# Patient Record
Sex: FEMALE | Race: BLACK OR AFRICAN AMERICAN | Employment: FULL TIME | URBAN - METROPOLITAN AREA
[De-identification: names, ages, dates, MRNs, and addresses within clinical notes are randomized per-mention and may not be internally consistent; named-entity substitution may affect disease eponyms.]

---

## 2017-07-10 ENCOUNTER — OFFICE VISIT (OUTPATIENT)
Dept: INTERNAL MEDICINE CLINIC | Age: 37
End: 2017-07-10

## 2017-07-10 VITALS
WEIGHT: 244.7 LBS | RESPIRATION RATE: 16 BRPM | TEMPERATURE: 98 F | DIASTOLIC BLOOD PRESSURE: 90 MMHG | OXYGEN SATURATION: 97 % | HEIGHT: 62 IN | BODY MASS INDEX: 45.03 KG/M2 | HEART RATE: 69 BPM | SYSTOLIC BLOOD PRESSURE: 120 MMHG

## 2017-07-10 DIAGNOSIS — E78.00 HYPERCHOLESTEREMIA: ICD-10-CM

## 2017-07-10 DIAGNOSIS — G25.81 RESTLESS LEG SYNDROME: Primary | ICD-10-CM

## 2017-07-10 DIAGNOSIS — R53.82 CHRONIC FATIGUE: ICD-10-CM

## 2017-07-10 DIAGNOSIS — M62.838 MUSCLE SPASM: ICD-10-CM

## 2017-07-10 DIAGNOSIS — Z76.89 ESTABLISHING CARE WITH NEW DOCTOR, ENCOUNTER FOR: ICD-10-CM

## 2017-07-10 LAB
CHOLEST SERPL-MCNC: 213 MG/DL
GLUCOSE POC: 98 MG/DL
HDLC SERPL-MCNC: 56 MG/DL
LDL CHOLESTEROL POC: 128 MG/DL
NON-HDL GOAL (POC): 157
TCHOL/HDL RATIO (POC): 3.8
TRIGL SERPL-MCNC: 144 MG/DL

## 2017-07-10 RX ORDER — GABAPENTIN 100 MG/1
CAPSULE ORAL
Qty: 30 CAP | Refills: 6 | Status: SHIPPED | OUTPATIENT
Start: 2017-07-10 | End: 2018-01-16 | Stop reason: SDUPTHER

## 2017-07-10 RX ORDER — CYCLOBENZAPRINE HCL 10 MG
10 TABLET ORAL 2 TIMES DAILY
Qty: 60 TAB | Refills: 12 | Status: SHIPPED | OUTPATIENT
Start: 2017-07-10 | End: 2018-09-01 | Stop reason: SDUPTHER

## 2017-07-10 RX ORDER — PREDNISOLONE ACETATE 10 MG/ML
SUSPENSION/ DROPS OPHTHALMIC
COMMUNITY
Start: 2017-06-27 | End: 2018-02-19 | Stop reason: ALTCHOICE

## 2017-07-10 RX ORDER — IBUPROFEN 800 MG/1
800 TABLET ORAL
Qty: 60 TAB | Refills: 12 | Status: SHIPPED | OUTPATIENT
Start: 2017-07-10 | End: 2018-07-13 | Stop reason: SDUPTHER

## 2017-07-10 RX ORDER — CLINDAMYCIN PHOSPHATE 10 UG/ML
LOTION TOPICAL
COMMUNITY
Start: 2017-06-29 | End: 2020-11-03 | Stop reason: ALTCHOICE

## 2017-07-10 NOTE — PROGRESS NOTES
Alexy Wen is a 40 y.o. female and presents with \[facility] [place] [facility]\"" Care  . Subjective:  She had had a corneal transplant in the past  She states she is disabled from her eye problem. .      She states she needs thyroid testing. She has had recurrent muscle spasms  She has had aching in her calf muscles mainly at night with a crawling sensation reported      Review of Systems  Constitutional: negative for fevers, chills, anorexia and weight loss  Eyes:   visual disturbance and irritation  ENT:   negative for tinnitus,sore throat,nasal congestion,ear pains. hoarseness  Respiratory:  negative for cough, hemoptysis, dyspnea,wheezing  CV:   negative for chest pain, palpitations, lower extremity edema  GI:   negative for nausea, vomiting, diarrhea, abdominal pain,melena  Endo:               negative for polyuria,polydipsia,polyphagia,heat intolerance  Genitourinary: negative for frequency, dysuria and hematuria  Integument:  negative for rash and pruritus  Hematologic:  negative for easy bruising and gum/nose bleeding  Musculoskel: myalgias, arthralgias,  Neurological:  negative for headaches, dizziness, vertigo, memory problems and gait   Behavl/Psych: negative for feelings of anxiety, depression, mood changes    History reviewed. No pertinent past medical history.   Past Surgical History:   Procedure Laterality Date    HX HEENT  08/2016    cornea transplant    HX HEENT  2015    cornea transplant     Social History     Social History    Marital status:      Spouse name: N/A    Number of children: N/A    Years of education: N/A     Social History Main Topics    Smoking status: Former Smoker    Smokeless tobacco: Never Used    Alcohol use No    Drug use: No    Sexual activity: Yes     Partners: Male     Other Topics Concern    None     Social History Narrative    None     Family History   Problem Relation Age of Onset    Hypertension Mother     Hypertension Father     Diabetes Father      Current Outpatient Prescriptions   Medication Sig Dispense Refill    clindamycin (CLEOCIN T) 1 % lotion       prednisoLONE acetate (PRED FORTE) 1 % ophthalmic suspension       gabapentin (NEURONTIN) 100 mg capsule Si tab daily 2 hours before bedtime 30 Cap 6    ibuprofen (MOTRIN) 800 mg tablet Take 1 Tab by mouth two (2) times daily (after meals). 60 Tab 12    cyclobenzaprine (FLEXERIL) 10 mg tablet Take 1 Tab by mouth two (2) times a day. 60 Tab 12     Not on File    Objective:  Visit Vitals    /90    Pulse 69    Temp 98 °F (36.7 °C) (Oral)    Resp 16    Ht 5' 2\" (1.575 m)    Wt 244 lb 11.2 oz (111 kg)    SpO2 97%    BMI 44.76 kg/m2     Physical Exam:   General appearance - alert, well appearing, and in no distress  Mental status - alert, oriented to person, place, and time  EYE-EVELIA, EOMI, corneas normal, no foreign bodies  ENT-ENT exam normal, no neck nodes or sinus tenderness  Nose - normal and patent, no erythema, discharge or polyps  Mouth - mucous membranes moist, pharynx normal without lesions  Neck - supple, no significant adenopathy   Chest - clear to auscultation, no wheezes, rales or rhonchi, symmetric air entry   Heart - normal rate, regular rhythm, normal S1, S2, no murmurs, rubs, clicks or gallops   Abdomen - soft, nontender, nondistended, no masses or organomegaly  Lymph- no adenopathy palpable  Ext-peripheral pulses normal, no pedal edema, no clubbing or cyanosis  Skin-Warm and dry.  no hyperpigmentation, vitiligo, or suspicious lesions  Neuro -alert, oriented, normal speech, no focal findings or movement disorder noted  Neck-normal C-spine, no tenderness, full ROM without pain  Feet-no nail deformities or callus formation with good pulses noted      Results for orders placed or performed in visit on 07/10/17   AMB POC LIPID PROFILE   Result Value Ref Range    Cholesterol (POC) 213     Triglycerides (POC) 144     HDL Cholesterol (POC) 56     LDL Cholesterol (POC) 128 MG/DL    Non-HDL Goal (POC) 157     TChol/HDL Ratio (POC) 3.8    AMB POC GLUCOSE BLOOD, BY GLUCOSE MONITORING DEVICE   Result Value Ref Range    Glucose POC 98 mg/dL       Assessment/Plan:    ICD-10-CM ICD-9-CM    1. Restless leg syndrome G25.81 333.94    2. Establishing care with new doctor, encounter for Z71.89 V65.8 clindamycin (CLEOCIN T) 1 % lotion      prednisoLONE acetate (PRED FORTE) 1 % ophthalmic suspension      AMB POC LIPID PROFILE      CBC W/O DIFF      METABOLIC PANEL, COMPREHENSIVE   3. Chronic fatigue R53.82 780.79 AMB POC GLUCOSE BLOOD, BY GLUCOSE MONITORING DEVICE      T3 TOTAL      T4, FREE      TSH 3RD GENERATION   4. Muscle spasm M62.838 728.85    5. Hypercholesteremia E78.00 272.0      Orders Placed This Encounter    CBC W/O DIFF    METABOLIC PANEL, COMPREHENSIVE    T3 TOTAL    T4, FREE    TSH 3RD GENERATION    AMB POC LIPID PROFILE    AMB POC GLUCOSE BLOOD, BY GLUCOSE MONITORING DEVICE    clindamycin (CLEOCIN T) 1 % lotion    prednisoLONE acetate (PRED FORTE) 1 % ophthalmic suspension    gabapentin (NEURONTIN) 100 mg capsule     Sig: Si tab daily 2 hours before bedtime     Dispense:  30 Cap     Refill:  6    ibuprofen (MOTRIN) 800 mg tablet     Sig: Take 1 Tab by mouth two (2) times daily (after meals). Dispense:  60 Tab     Refill:  12    cyclobenzaprine (FLEXERIL) 10 mg tablet     Sig: Take 1 Tab by mouth two (2) times a day. Dispense:  60 Tab     Refill:  12     lose weight, increase physical activity, follow low fat diet, follow low salt diet,Take 81mg aspirin daily  Patient Instructions   [employer] Activation    Thank you for requesting access to [employer]. Please follow the instructions below to securely access and download your online medical record. [employer] allows you to send messages to your doctor, view your test results, renew your prescriptions, schedule appointments, and more. How Do I Sign Up? 1. In your internet browser, go to [email or web address] [employer] Management  2.  Click on the First Time User? Click Here link in the Sign In box. You will be redirect to the New Member Sign Up page. 3. Enter your Yotpot Access Code exactly as it appears below. You will not need to use this code after youve completed the sign-up process. If you do not sign up before the expiration date, you must request a new code. MyChart Access Code: Activation code not generated  Current [employer] Status: Patient Declined (This is the date your MyChart access code will )    4. Enter the last four digits of your Social Security Number (xxxx) and Date of Birth (mm/dd/yyyy) as indicated and click Submit. You will be taken to the next sign-up page. 5. Create a Yotpot ID. This will be your [employer] login ID and cannot be changed, so think of one that is secure and easy to remember. 6. Create a Yotpot password. You can change your password at any time. 7. Enter your Password Reset Question and Answer. This can be used at a later time if you forget your password. 8. Enter your e-mail address. You will receive e-mail notification when new information is available in [address] 9. Click Sign Up. You can now view and download portions of your medical record. 10. Click the Download Summary menu link to download a portable copy of your medical information. Additional Information    If you have questions, please visit the Frequently Asked Questions section of the Yotpot website at [email or web address] Remember, [employer] is NOT to be used for urgent needs. For medical emergencies, dial 911. Follow-up Disposition:  Return in about 4 weeks (around 2017), or if symptoms worsen or fail to improve. I have reviewed with the patient details of the assessment and plan and all questions were answered. Relevent patient education was performed. The most recent lab findings were reviewed with the patient. An After Visit Summary was printed and given to the patient.

## 2017-07-10 NOTE — MR AVS SNAPSHOT
Visit Information Date & Time Provider Department Dept. Phone Encounter #  
 7/10/2017  8:45 AM Shi Pimentel MD [facility] [phone] [id number] Follow-up Instructions Return in about 4 weeks (around 8/7/2017), or if symptoms worsen or fail to improve. Upcoming Health Maintenance Date Due  
 PAP AKA CERVICAL CYTOLOGY 4/17/2001 DTaP/Tdap/Td series (1 - Tdap) 1/24/2019* INFLUENZA AGE 9 TO ADULT 8/1/2017 *Topic was postponed. The date shown is not the original due date. Allergies as of 7/10/2017  Review Complete On: 7/10/2017 By: Shi Pimentel MD  
 Not on File Current Immunizations  Never Reviewed No immunizations on file. Not reviewed this visit You Were Diagnosed With   
  
 Codes Comments Restless leg syndrome    -  Primary ICD-10-CM: G25.81 ICD-9-CM: 333.94 Establishing care with new doctor, encounter for     ICD-10-CM: Z71.89 ICD-9-CM: V65.8 Chronic fatigue     ICD-10-CM: R53.82 
ICD-9-CM: 780.79 Muscle spasm     ICD-10-CM: D75.596 ICD-9-CM: 728.85 Hypercholesteremia     ICD-10-CM: E78.00 ICD-9-CM: 272.0 Vitals BP Pulse Temp Resp Height(growth percentile) Weight(growth percentile) 120/90 69 98 °F (36.7 °C) (Oral) 16 5' 2\" (1.575 m) 244 lb 11.2 oz (111 kg) SpO2 BMI Smoking Status 97% 44.76 kg/m2 Former Smoker BMI and BSA Data Body Mass Index Body Surface Area 44.76 kg/m 2 2.2 m 2 Preferred Pharmacy Pharmacy Name Phone [address], [address] [phone] Your Updated Medication List  
  
   
This list is accurate as of: 7/10/17 10:24 AM.  Always use your most recent med list.  
  
  
  
  
 clindamycin 1 % lotion Commonly known as:  CLEOCIN T  
  
 cyclobenzaprine 10 mg tablet Commonly known as:  FLEXERIL  
 Take 1 Tab by mouth two (2) times a day.  
  
 gabapentin 100 mg capsule Commonly known as:  NEURONTIN Si tab daily 2 hours before bedtime  
  
 ibuprofen 800 mg tablet Commonly known as:  MOTRIN Take 1 Tab by mouth two (2) times daily (after meals). prednisoLONE acetate 1 % ophthalmic suspension Commonly known as:  PRED FORTE Prescriptions Sent to Pharmacy Refills  
 gabapentin (NEURONTIN) 100 mg capsule 6 Sig: Si tab daily 2 hours before bedtime Class: Normal  
 Pharmacy: [facility] [address], [address] [facility] Ph #: [phone]  
 ibuprofen (MOTRIN) 800 mg tablet 12 Sig: Take 1 Tab by mouth two (2) times daily (after meals). Class: Normal  
 Pharmacy: [address] Ph #: [phone] Route: Oral  
 cyclobenzaprine (FLEXERIL) 10 mg tablet 12 Sig: Take 1 Tab by mouth two (2) times a day. Class: Normal  
 Pharmacy: [facility] [address] Ph #: [phone] Route: Oral  
  
We Performed the Following AMB POC GLUCOSE BLOOD, BY GLUCOSE MONITORING DEVICE [30647 CPT(R)] AMB POC LIPID PROFILE [90474 CPT(R)] CBC W/O DIFF [83069 CPT(R)] METABOLIC PANEL, COMPREHENSIVE [37142 CPT(R)]   
 T3 TOTAL [56445 CPT(R)] T4, FREE [id number] CPT(R)] TSH 3RD GENERATION [62426 CPT(R)] Follow-up Instructions Return in about 4 weeks (around 2017), or if symptoms worsen or fail to improve. Patient Instructions IROCKE Activation Thank you for requesting access to IROCKE. Please follow the instructions below to securely access and download your online medical record.  IROCKE allows you to send messages to your doctor, view your test results, renew your prescriptions, schedule appointments, and more. How Do I Sign Up? 1. In your internet browser, go to [email or web address]. [employer] 
2. Click on the First Time User? Click Here link in the Sign In box. You will be redirect to the New Member Sign Up page. 3. Enter your Tranz Access Code exactly as it appears below. You will not need to use this code after youve completed the sign-up process. If you do not sign up before the expiration date, you must request a new code. MyChart Access Code: Activation code not generated Current Tranz Status: Patient Declined (This is the date your MyChart access code will ) 4. Enter the last four digits of your Social Security Number (xxxx) and Date of Birth (mm/dd/yyyy) as indicated and click Submit. You will be taken to the next sign-up page. 5. Create a Tranz ID. This will be your Tranz login ID and cannot be changed, so think of one that is secure and easy to remember. 6. Create a Tranz password. You can change your password at any time. 7. Enter your Password Reset Question and Answer. This can be used at a later time if you forget your password. 8. Enter your e-mail address. You will receive e-mail notification when new information is available in [address] 9. Click Sign Up. You can now view and download portions of your medical record. 10. Click the Download Summary menu link to download a portable copy of your medical information. Additional Information If you have questions, please visit the Frequently Asked Questions section of the Tranz website at [email or web address] Remember, Tranz is NOT to be used for urgent needs. For medical emergencies, dial 911. Please provide this summary of care documentation to your next provider. Your primary care clinician is listed as Kenji Alfredo. If you have any questions after today's visit, please call [phone].

## 2017-07-10 NOTE — PATIENT INSTRUCTIONS
[employer] Activation    Thank you for requesting access to wishkicker. Please follow the instructions below to securely access and download your online medical record. wishkicker allows you to send messages to your doctor, view your test results, renew your prescriptions, schedule appointments, and more. How Do I Sign Up? 1. In your internet browser, go to [email or web address]  2. Click on the First Time User? Click Here link in the Sign In box. You will be redirect to the New Member Sign Up page. 3. Enter your wishkicker Access Code exactly as it appears below. You will not need to use this code after youve completed the sign-up process. If you do not sign up before the expiration date, you must request a new code. wishkicker Access Code: Activation code not generated  Current wishkicker Status: Patient Declined (This is the date your wishkicker access code will )    4. Enter the last four digits of your Social Security Number (xxxx) and Date of Birth (mm/dd/yyyy) as indicated and click Submit. You will be taken to the next sign-up page. 5. Create a wishkicker ID. This will be your wishkicker login ID and cannot be changed, so think of one that is secure and easy to remember. 6. Create a wishkicker password. You can change your password at any time. 7. Enter your Password Reset Question and Answer. This can be used at a later time if you forget your password. 8. Enter your e-mail address. You will receive e-mail notification when new information is available in [address] 9. Click Sign Up. You can now view and download portions of your medical record. 10. Click the Download Summary menu link to download a portable copy of your medical information. Additional Information    If you have questions, please visit the Frequently Asked Questions section of the wishkicker website at [email or web address] Remember, wishkicker is NOT to be used for urgent needs. For medical emergencies, dial 911.

## 2017-07-11 LAB
ALBUMIN SERPL-MCNC: 4.1 G/DL (ref 3.5–5.5)
ALBUMIN/GLOB SERPL: 1.4 {RATIO} (ref 1.2–2.2)
ALP SERPL-CCNC: 59 IU/L (ref 39–117)
ALT SERPL-CCNC: 7 IU/L (ref 0–32)
AST SERPL-CCNC: 12 IU/L (ref 0–40)
BILIRUB SERPL-MCNC: 0.3 MG/DL (ref 0–1.2)
BUN SERPL-MCNC: 10 MG/DL (ref 6–20)
BUN/CREAT SERPL: 10 (ref 9–23)
CALCIUM SERPL-MCNC: 9.3 MG/DL (ref 8.7–10.2)
CHLORIDE SERPL-SCNC: 101 MMOL/L (ref 96–106)
CO2 SERPL-SCNC: 27 MMOL/L (ref 18–29)
CREAT SERPL-MCNC: 1.02 MG/DL (ref 0.57–1)
ERYTHROCYTE [DISTWIDTH] IN BLOOD BY AUTOMATED COUNT: 12.9 % (ref 12.3–15.4)
GLOBULIN SER CALC-MCNC: 2.9 G/DL (ref 1.5–4.5)
GLUCOSE SERPL-MCNC: 92 MG/DL (ref 65–99)
HCT VFR BLD AUTO: 41.6 % (ref 34–46.6)
HGB BLD-MCNC: 13.7 G/DL (ref 11.1–15.9)
MCH RBC QN AUTO: 29.9 PG (ref 26.6–33)
MCHC RBC AUTO-ENTMCNC: 32.9 G/DL (ref 31.5–35.7)
MCV RBC AUTO: 91 FL (ref 79–97)
PLATELET # BLD AUTO: 339 X10E3/UL (ref 150–379)
POTASSIUM SERPL-SCNC: 4.7 MMOL/L (ref 3.5–5.2)
PROT SERPL-MCNC: 7 G/DL (ref 6–8.5)
RBC # BLD AUTO: 4.58 X10E6/UL (ref 3.77–5.28)
SODIUM SERPL-SCNC: 141 MMOL/L (ref 134–144)
T3 SERPL-MCNC: 130 NG/DL (ref 71–180)
T4 FREE SERPL-MCNC: 1.21 NG/DL (ref 0.82–1.77)
TSH SERPL DL<=0.005 MIU/L-ACNC: 1.25 UIU/ML (ref 0.45–4.5)
WBC # BLD AUTO: 4.5 X10E3/UL (ref 3.4–10.8)

## 2017-08-10 ENCOUNTER — OFFICE VISIT (OUTPATIENT)
Dept: INTERNAL MEDICINE CLINIC | Age: 37
End: 2017-08-10

## 2017-08-10 VITALS
SYSTOLIC BLOOD PRESSURE: 124 MMHG | HEIGHT: 62 IN | RESPIRATION RATE: 16 BRPM | WEIGHT: 245 LBS | HEART RATE: 74 BPM | BODY MASS INDEX: 45.08 KG/M2 | OXYGEN SATURATION: 98 % | TEMPERATURE: 98.9 F | DIASTOLIC BLOOD PRESSURE: 86 MMHG

## 2017-08-10 DIAGNOSIS — E78.00 HYPERCHOLESTEREMIA: Primary | ICD-10-CM

## 2017-08-10 DIAGNOSIS — R53.82 CHRONIC FATIGUE: ICD-10-CM

## 2017-08-10 NOTE — PROGRESS NOTES
Reuben Escalera is a 40 y.o. female and presents with Results (labs)  . Subjective:  Dyslipidemia Review:  Patient presents for evaluation of lipids. Compliance with treatment thus far has been excellent. A repeat fasting lipid profile was done. The patient does not use medications that may worsen dyslipidemias (corticosteroids, progestins, anabolic steroids, diuretics, beta-blockers, amiodarone, cyclosporine, olanzapine). The patient exercises some  She is not on medication    She still has some fatigue reported. All labs except lipid panel were normal    Review of Systems  Constitutional: negative for fevers, chills, anorexia and weight loss  Eyes:   negative for visual disturbance and irritation  ENT:   negative for tinnitus,sore throat,nasal congestion,ear pains. hoarseness  Respiratory:  negative for cough, hemoptysis, dyspnea,wheezing  CV:   negative for chest pain, palpitations, lower extremity edema  GI:   negative for nausea, vomiting, diarrhea, abdominal pain,melena  Endo:               negative for polyuria,polydipsia,polyphagia,heat intolerance  Genitourinary: negative for frequency, dysuria and hematuria  Integument:  negative for rash and pruritus  Hematologic:  negative for easy bruising and gum/nose bleeding  Musculoskel: negative for myalgias, arthralgias, back pain, muscle weakness, joint pain  Neurological:  negative for headaches, dizziness, vertigo, memory problems and gait   Behavl/Psych: negative for feelings of anxiety, depression, mood changes    History reviewed. No pertinent past medical history.   Past Surgical History:   Procedure Laterality Date    HX HEENT  08/2016    cornea transplant    HX HEENT  2015    cornea transplant     Social History     Social History    Marital status:      Spouse name: N/A    Number of children: N/A    Years of education: N/A     Social History Main Topics    Smoking status: Former Smoker    Smokeless tobacco: Never Used    Alcohol use No    Drug use: No    Sexual activity: Yes     Partners: Male     Other Topics Concern    None     Social History Narrative     Family History   Problem Relation Age of Onset    Hypertension Mother     Hypertension Father     Diabetes Father      Current Outpatient Prescriptions   Medication Sig Dispense Refill    clindamycin (CLEOCIN T) 1 % lotion       prednisoLONE acetate (PRED FORTE) 1 % ophthalmic suspension       gabapentin (NEURONTIN) 100 mg capsule Si tab daily 2 hours before bedtime 30 Cap 6    ibuprofen (MOTRIN) 800 mg tablet Take 1 Tab by mouth two (2) times daily (after meals). 60 Tab 12    cyclobenzaprine (FLEXERIL) 10 mg tablet Take 1 Tab by mouth two (2) times a day. 60 Tab 12     Not on File    Objective:  Visit Vitals    /86    Pulse 74    Temp 98.9 °F (37.2 °C) (Oral)    Resp 16    Ht 5' 2\" (1.575 m)    Wt 245 lb (111.1 kg)    SpO2 98%    BMI 44.81 kg/m2     Physical Exam:   General appearance - alert, well appearing, and in no distress  Mental status - alert, oriented to person, place, and time  EYE-EVELIA, EOMI, corneas normal, no foreign bodies  ENT-ENT exam normal, no neck nodes or sinus tenderness  Nose - normal and patent, no erythema, discharge or polyps  Mouth - mucous membranes moist, pharynx normal without lesions  Neck - supple, no significant adenopathy   Chest - clear to auscultation, no wheezes, rales or rhonchi, symmetric air entry   Heart - normal rate, regular rhythm, normal S1, S2, no murmurs, rubs, clicks or gallops   Abdomen - soft, nontender, nondistended, no masses or organomegaly  Lymph- no adenopathy palpable  Ext-peripheral pulses normal, no pedal edema, no clubbing or cyanosis  Skin-Warm and dry.  no hyperpigmentation, vitiligo, or suspicious lesions  Neuro -alert, oriented, normal speech, no focal findings or movement disorder noted  Neck-normal C-spine, no tenderness, full ROM without pain  Feet-no nail deformities or callus formation with good pulses noted      Results for orders placed or performed in visit on 07/10/17   CBC W/O DIFF   Result Value Ref Range    WBC 4.5 3.4 - 10.8 x10E3/uL    RBC 4.58 3.77 - 5.28 x10E6/uL    HGB 13.7 11.1 - 15.9 g/dL    HCT 41.6 34.0 - 46.6 %    MCV 91 79 - 97 fL    MCH 29.9 26.6 - 33.0 pg    MCHC 32.9 31.5 - 35.7 g/dL    RDW 12.9 12.3 - 15.4 %    PLATELET 770 422 - 264 [id number]/WJ   METABOLIC PANEL, COMPREHENSIVE   Result Value Ref Range    Glucose 92 65 - 99 mg/dL    BUN 10 6 - 20 mg/dL    Creatinine 1.02 (H) 0.57 - 1.00 mg/dL    GFR est non-AA 70 >59 mL/min/1.73    GFR est AA 81 >59 mL/min/1.73    BUN/Creatinine ratio 10 9 - 23    Sodium 141 134 - 144 mmol/L    Potassium 4.7 3.5 - 5.2 mmol/L    Chloride 101 96 - 106 mmol/L    CO2 27 18 - 29 mmol/L    Calcium 9.3 8.7 - 10.2 mg/dL    Protein, total 7.0 6.0 - 8.5 g/dL    Albumin 4.1 3.5 - 5.5 g/dL    GLOBULIN, TOTAL 2.9 1.5 - 4.5 g/dL    A-G Ratio 1.4 1.2 - 2.2    Bilirubin, total 0.3 0.0 - 1.2 mg/dL    Alk. phosphatase 59 39 - 117 IU/L    AST (SGOT) 12 0 - 40 IU/L    ALT (SGPT) 7 0 - 32 IU/L   T3 TOTAL   Result Value Ref Range    T3, total 130 71 - 180 ng/dL   T4, FREE   Result Value Ref Range    T4, Free 1.21 0.82 - 1.77 ng/dL   TSH 3RD GENERATION   Result Value Ref Range    TSH 1.250 0.450 - 4.500 uIU/mL   AMB POC LIPID PROFILE   Result Value Ref Range    Cholesterol (POC) 213     Triglycerides (POC) 144     HDL Cholesterol (POC) 56     LDL Cholesterol (POC) 128 MG/DL    Non-HDL Goal (POC) 157     TChol/HDL Ratio (POC) 3.8    AMB POC GLUCOSE BLOOD, BY GLUCOSE MONITORING DEVICE   Result Value Ref Range    Glucose POC 98 mg/dL       Assessment/Plan:    ICD-10-CM ICD-9-CM    1. Hypercholesteremia E78.00 272.0    2. Chronic fatigue R53.82 780.79      No orders of the defined types were placed in this encounter.     lose weight, increase physical activity, follow low fat diet, follow low salt diet,Take 81mg aspirin daily  The patient has elected lifestyle modification to include diet and exercise  Patient Instructions   MyChart Activation    Thank you for requesting access to Ornis. Please follow the instructions below to securely access and download your online medical record. Ornis allows you to send messages to your doctor, view your test results, renew your prescriptions, schedule appointments, and more. How Do I Sign Up? 1. In your internet browser, go to [email or web address]  2. Click on the First Time User? Click Here link in the Sign In box. You will be redirect to the New Member Sign Up page. 3. Enter your Ornis Access Code exactly as it appears below. You will not need to use this code after youve completed the sign-up process. If you do not sign up before the expiration date, you must request a new code. Ornis Access Code: Activation code not generated  Current Ornis Status: Patient Declined (This is the date your Ornis access code will )    4. Enter the last four digits of your Social Security Number (xxxx) and Date of Birth (mm/dd/yyyy) as indicated and click Submit. You will be taken to the next sign-up page. 5. Create a Ornis ID. This will be your Ornis login ID and cannot be changed, so think of one that is secure and easy to remember. 6. Create a Ornis password. You can change your password at any time. 7. Enter your Password Reset Question and Answer. This can be used at a later time if you forget your password. 8. Enter your e-mail address. You will receive e-mail notification when new information is available in [address] 9. Click Sign Up. You can now view and download portions of your medical record. 10. Click the Download Summary menu link to download a portable copy of your medical information. Additional Information    If you have questions, please visit the Frequently Asked Questions section of the Ornis website at [email or web address] Remember, Ornis is NOT to be used for urgent needs. For medical emergencies, dial 911. Follow-up Disposition:  Return in about 3 months (around 11/10/2017), or if symptoms worsen or fail to improve. I have reviewed with the patient details of the assessment and plan and all questions were answered. Relevent patient education was performed. The most recent lab findings were reviewed with the patient. An After Visit Summary was printed and given to the patient.

## 2017-08-10 NOTE — PATIENT INSTRUCTIONS
InstantLuxehar3Leaf Activation    Thank you for requesting access to [facility]. Please follow the instructions below to securely access and download your online medical record. [facility] allows you to send messages to your doctor, view your test results, renew your prescriptions, schedule appointments, and more. How Do I Sign Up? 1. In your internet browser, go to [email or web address]  2. Click on the First Time User? Click Here link in the Sign In box. You will be redirect to the New Member Sign Up page. 3. Enter your [facility] Access Code exactly as it appears below. You will not need to use this code after youve completed the sign-up process. If you do not sign up before the expiration date, you must request a new code. [facility] Access Code: Activation code not generated  Current [facility] Status: Patient Declined (This is the date your [facility] access code will )    4. Enter the last four digits of your Social Security Number (xxxx) and Date of Birth (mm/dd/yyyy) as indicated and click Submit. You will be taken to the next sign-up page. 5. Create a [facility] ID. This will be your [facility] login ID and cannot be changed, so think of one that is secure and easy to remember. 6. Create a [facility] password. You can change your password at any time. 7. Enter your Password Reset Question and Answer. This can be used at a later time if you forget your password. 8. Enter your e-mail address. You will receive e-mail notification when new information is available in [address] 9. Click Sign Up. You can now view and download portions of your medical record. 10. Click the Download Summary menu link to download a portable copy of your medical information. Additional Information    If you have questions, please visit the Frequently Asked Questions section of the [facility] website at [email or web address] Remember, [facility] is NOT to be used for urgent needs. For medical emergencies, dial 911.

## 2017-08-10 NOTE — MR AVS SNAPSHOT
Visit Information Date & Time Provider Department Dept. Phone Encounter #  
 8/10/2017  8:00 AM Dayanara Carreno MD [address] [facility] [phone] [id number] Follow-up Instructions Return in about 3 months (around 11/10/2017), or if symptoms worsen or fail to improve. Upcoming Health Maintenance Date Due  
 PAP AKA CERVICAL CYTOLOGY 2001 INFLUENZA AGE 9 TO ADULT 2017 DTaP/Tdap/Td series (1 - Tdap) 2019* *Topic was postponed. The date shown is not the original due date. Allergies as of 8/10/2017  Review Complete On: 8/10/2017 By: April Cole Lay LPN Not on File Current Immunizations  Never Reviewed No immunizations on file. Not reviewed this visit You Were Diagnosed With   
  
 Codes Comments Hypercholesteremia    -  Primary ICD-10-CM: E78.00 ICD-9-CM: 272.0 Chronic fatigue     ICD-10-CM: R53.82 
ICD-9-CM: 780.79 Vitals BP Pulse Temp Resp Height(growth percentile) Weight(growth percentile) 124/86 74 98.9 °F (37.2 °C) (Oral) 16 5' 2\" (1.575 m) 245 lb (111.1 kg) SpO2 BMI Smoking Status 98% 44.81 kg/m2 Former Smoker BMI and BSA Data Body Mass Index Body Surface Area 44.81 kg/m 2 2.2 m 2 Preferred Pharmacy Pharmacy Name Phone [address], [address] [phone] Your Updated Medication List  
  
   
This list is accurate as of: 8/10/17  8:49 AM.  Always use your most recent med list.  
  
  
  
  
 clindamycin 1 % lotion Commonly known as:  CLEOCIN T  
  
 cyclobenzaprine 10 mg tablet Commonly known as:  FLEXERIL Take 1 Tab by mouth two (2) times a day.  
  
 gabapentin 100 mg capsule Commonly known as:  NEURONTIN Si tab daily 2 hours before bedtime  
  
 ibuprofen 800 mg tablet Commonly known as:  MOTRIN  
 Take 1 Tab by mouth two (2) times daily (after meals). prednisoLONE acetate 1 % ophthalmic suspension Commonly known as:  PRED FORTE Follow-up Instructions Return in about 3 months (around 11/10/2017), or if symptoms worsen or fail to improve. Patient Instructions Tem[employer] Term[employer] Activation Thank you for requesting access to [facility]. Please follow the instructions below to securely access and download your online medical record. [facility] allows you to send messages to your doctor, view your test results, renew your prescriptions, schedule appointments, and more. How Do I Sign Up? 1. In your internet browser, go to [email or web address] 
2. Click on the First Time User? Click Here link in the Sign In box. You will be redirect to the New Member Sign Up page. 3. Enter your [facility] Access Code exactly as it appears below. You will not need to use this code after youve completed the sign-up process. If you do not sign up before the expiration date, you must request a new code. [facility] Access Code: Activation code not generated Current [facility] Status: Patient Declined (This is the date your [facility] access code will ) 4. Enter the last four digits of your Social Security Number (xxxx) and Date of Birth (mm/dd/yyyy) as indicated and click Submit. You will be taken to the next sign-up page. 5. Create a [facility] ID. This will be your [facility] login ID and cannot be changed, so think of one that is secure and easy to remember. 6. Create a [facility] password. You can change your password at any time. 7. Enter your Password Reset Question and Answer. This can be used at a later time if you forget your password. 8. Enter your e-mail address. You will receive e-mail notification when new information is available in [address] 9. Click Sign Up. You can now view and download portions of your medical record.  
10. Click the Download Summary menu link to download a portable copy of your medical information. Additional Information If you have questions, please visit the Frequently Asked Questions section of the [facility] website at [email or web address] Remember, [facility] is NOT to be used for urgent needs. For medical emergencies, dial 911. Please provide this summary of care documentation to your next provider. Your primary care clinician is listed as Kenji Alfredo. If you have any questions after today's visit, please call [phone].

## 2017-08-22 ENCOUNTER — TELEPHONE (OUTPATIENT)
Dept: INTERNAL MEDICINE CLINIC | Age: 37
End: 2017-08-22

## 2017-08-22 NOTE — TELEPHONE ENCOUNTER
Pt is requesting a RX for her allergy to be called into [facility].  Best contact [phone].

## 2017-11-10 ENCOUNTER — OFFICE VISIT (OUTPATIENT)
Dept: INTERNAL MEDICINE CLINIC | Age: 37
End: 2017-11-10

## 2017-11-10 VITALS
HEART RATE: 80 BPM | WEIGHT: 238 LBS | BODY MASS INDEX: 43.79 KG/M2 | RESPIRATION RATE: 16 BRPM | OXYGEN SATURATION: 99 % | TEMPERATURE: 98 F | SYSTOLIC BLOOD PRESSURE: 140 MMHG | HEIGHT: 62 IN | DIASTOLIC BLOOD PRESSURE: 100 MMHG

## 2017-11-10 DIAGNOSIS — J30.89 CHRONIC NONSEASONAL ALLERGIC RHINITIS DUE TO POLLEN: ICD-10-CM

## 2017-11-10 DIAGNOSIS — I10 ESSENTIAL HYPERTENSION: ICD-10-CM

## 2017-11-10 DIAGNOSIS — E78.00 HYPERCHOLESTEREMIA: Primary | ICD-10-CM

## 2017-11-10 DIAGNOSIS — Z00.00 MEDICARE ANNUAL WELLNESS VISIT, SUBSEQUENT: ICD-10-CM

## 2017-11-10 LAB
CHOLEST SERPL-MCNC: 243 MG/DL
HDLC SERPL-MCNC: 55 MG/DL
LDL CHOLESTEROL POC: 146 MG/DL
NON-HDL GOAL (POC): 188
TCHOL/HDL RATIO (POC): 4.4
TRIGL SERPL-MCNC: 209 MG/DL

## 2017-11-10 RX ORDER — ATORVASTATIN CALCIUM 40 MG/1
40 TABLET, FILM COATED ORAL DAILY
Qty: 30 TAB | Refills: 12 | Status: SHIPPED | OUTPATIENT
Start: 2017-11-10 | End: 2017-12-04 | Stop reason: SDUPTHER

## 2017-11-10 RX ORDER — LOSARTAN POTASSIUM 100 MG/1
100 TABLET ORAL DAILY
Qty: 30 TAB | Refills: 12
Start: 2017-11-10 | End: 2017-11-13 | Stop reason: SDUPTHER

## 2017-11-10 RX ORDER — FLUTICASONE PROPIONATE 50 MCG
2 SPRAY, SUSPENSION (ML) NASAL DAILY
Qty: 1 BOTTLE | Refills: 12 | Status: SHIPPED | OUTPATIENT
Start: 2017-11-10 | End: 2017-12-04 | Stop reason: SDUPTHER

## 2017-11-10 RX ORDER — LORATADINE 10 MG/1
10 TABLET ORAL DAILY
Qty: 30 TAB | Refills: 12 | Status: SHIPPED | OUTPATIENT
Start: 2017-11-10

## 2017-11-10 NOTE — PROGRESS NOTES
Иван Crockett is a 40 y.o. female and presents with Cholesterol Problem; Annual Wellness Visit; and Allergies    Subjective:  Dyslipidemia Review:  Patient presents for evaluation of lipids. Compliance with treatment thus far has been excellent. A repeat fasting lipid profile was done. The patient does not use medications that may worsen dyslipidemias (corticosteroids, progestins, anabolic steroids, diuretics, beta-blockers, amiodarone, cyclosporine, olanzapine). The patient exercises some    Allergic Rhinitis  Patient presents for evaluation of allergic symptoms. Symptoms include nasal congestion, rhinorrhea, sneezing, eye itching, watery eyes. Precipitants haved included possible pollen. Hypertension Review:  The patient has essential hypertension of new onset  Diet and Lifestyle: generally follows a  low sodium diet, exercises sporadically  Home BP Monitoring: is not measured at home. Pertinent ROS: taking medications as instructed, no medication side effects noted, no TIA's, no chest pain on exertion, no dyspnea on exertion, no swelling of ankles. Иван Crockett is a 40 y.o. female and presents for annual Medicare Wellness Visit. Problem List: Reviewed with patient and discussed risk factors.     Patient Active Problem List   Diagnosis Code    ERRONEOUS ENCOUNTER--DISREGARD        Current medical providers:  Patient Care Team:  Fransisco Closs, MD as PCP - General (Family Practice)    PSH: Reviewed with patient  Past Surgical History:   Procedure Laterality Date    HX HEENT  08/2016    cornea transplant    HX HEENT  2015    cornea transplant        SH: Reviewed with patient  Social History   Substance Use Topics    Smoking status: Former Smoker    Smokeless tobacco: Never Used    Alcohol use No       FH: Reviewed with patient  Family History   Problem Relation Age of Onset    Hypertension Mother     Hypertension Father     Diabetes Father        Medications/Allergies: Reviewed with patient  Current Outpatient Prescriptions on File Prior to Visit   Medication Sig Dispense Refill    clindamycin (CLEOCIN T) 1 % lotion       prednisoLONE acetate (PRED FORTE) 1 % ophthalmic suspension       gabapentin (NEURONTIN) 100 mg capsule Si tab daily 2 hours before bedtime 30 Cap 6    ibuprofen (MOTRIN) 800 mg tablet Take 1 Tab by mouth two (2) times daily (after meals). 60 Tab 12    cyclobenzaprine (FLEXERIL) 10 mg tablet Take 1 Tab by mouth two (2) times a day. 60 Tab 12     No current facility-administered medications on file prior to visit. Not on File    Objective:  Visit Vitals    BP (!) 140/100    Pulse 80    Temp 98 °F (36.7 °C) (Oral)    Resp 16    Ht 5' 2\" (1.575 m)    Wt 238 lb (108 kg)    SpO2 99%    BMI 43.53 kg/m2    Body mass index is 43.53 kg/(m^2). Assessment of cognitive impairment: Alert and oriented x 3    Depression Screen:   PHQ over the last two weeks 7/10/2017   PHQ Not Done Active Diagnosis of Depression or Bipolar Disorder   Little interest or pleasure in doing things Not at all   Feeling down, depressed or hopeless Not at all   Total Score PHQ 2 0     Depression Review:  Patient is seen for screen of depression,denies anhedonia, weight gain, insomnia, hypersomnia, psychomotor agitation, psychomotor retardation, fatigue, feelings of worthlessness/guilt, difficulty concentrating, hopelessness, impaired memory and recurrent thoughts of death Treatment includes no medication   She denies recurrent thoughts of death and suicidal thoughts without plan. Fall Risk Assessment:  No flowsheet data found. Functional Ability:   Does the patient exhibit a steady gait? yes   How long did it take the patient to get up and walk from a sitting position? seconds   Is the patient self reliant?  (ie can do own laundry, meals, household chores)  yes     Does the patient handle his/her own medications? yes     Does the patient handle his/her own money?    yes     Is the patients home safe (ie good lighting, handrails on stairs and bath, etc.)? yes     Did you notice or did patient express any hearing difficulties? no     Did you notice or did patient express any vision difficulties?   no     Were distance and reading eye charts used? no       Advance Care Planning:   Patient was offered the opportunity to discuss advance care planning:  yes     Does patient have an Advance Directive:  no   If no, did you provide information on Caring Connections? yes       Plan:      Orders Placed This Encounter    AMB POC LIPID PROFILE    fluticasone (FLONASE) 50 mcg/actuation nasal spray    loratadine (CLARITIN) 10 mg tablet    losartan (COZAAR) 100 mg tablet    atorvastatin (LIPITOR) 40 mg tablet       Health Maintenance   Topic Date Due    PAP AKA CERVICAL CYTOLOGY  04/17/2001    Influenza Age 5 to Adult  11/23/2017 (Originally 8/1/2017)    DTaP/Tdap/Td series (1 - Tdap) 01/24/2019 (Originally 4/17/2001)   [address] [place] [address]  11/11/2018       *Patient verbalized understanding and agreement with the plan. A copy of the After Visit Summary with personalized health plan was given to the patient today. Review of Systems  Constitutional: negative for fevers, chills, anorexia and weight loss  Eyes:   negative for visual disturbance and irritation  ENT:   negative for tinnitus,sore throat,nasal congestion,ear pains. hoarseness  Respiratory:  negative for cough, hemoptysis, dyspnea,wheezing  CV:   negative for chest pain, palpitations, lower extremity edema  GI:   negative for nausea, vomiting, diarrhea, abdominal pain,melena  Endo:               negative for polyuria,polydipsia,polyphagia,heat intolerance  Genitourinary: negative for frequency, dysuria and hematuria  Integument:  negative for rash and pruritus  Hematologic:  negative for easy bruising and gum/nose bleeding  Musculoskel: negative for myalgias, arthralgias, back pain, muscle weakness, joint pain  Neurological:  negative for headaches, dizziness, vertigo, memory problems and gait   Behavl/Psych: negative for feelings of anxiety, depression, mood changes    History reviewed. No pertinent past medical history. Past Surgical History:   Procedure Laterality Date    HX HEENT  2016    cornea transplant    HX HEENT      cornea transplant     Social History     Social History    Marital status:      Spouse name: N/A    Number of children: N/A    Years of education: N/A     Social History Main Topics    Smoking status: Former Smoker    Smokeless tobacco: Never Used    Alcohol use No    Drug use: No    Sexual activity: Yes     Partners: Male     Other Topics Concern    None     Social History Narrative     Family History   Problem Relation Age of Onset    Hypertension Mother     Hypertension Father     Diabetes Father      Current Outpatient Prescriptions   Medication Sig Dispense Refill    fluticasone (FLONASE) 50 mcg/actuation nasal spray 2 Sprays by Both Nostrils route daily. 1 Bottle 12    loratadine (CLARITIN) 10 mg tablet Take 1 Tab by mouth daily. 30 Tab 12    losartan (COZAAR) 100 mg tablet Take 1 Tab by mouth daily. 30 Tab 12    atorvastatin (LIPITOR) 40 mg tablet Take 1 Tab by mouth daily. 30 Tab 12    clindamycin (CLEOCIN T) 1 % lotion       prednisoLONE acetate (PRED FORTE) 1 % ophthalmic suspension       gabapentin (NEURONTIN) 100 mg capsule Si tab daily 2 hours before bedtime 30 Cap 6    ibuprofen (MOTRIN) 800 mg tablet Take 1 Tab by mouth two (2) times daily (after meals). 60 Tab 12    cyclobenzaprine (FLEXERIL) 10 mg tablet Take 1 Tab by mouth two (2) times a day.  60 Tab 12     Not on File    Objective:  Visit Vitals    BP (!) 140/100    Pulse 80    Temp 98 °F (36.7 °C) (Oral)    Resp 16    Ht 5' 2\" (1.575 m)    Wt 238 lb (108 kg)    SpO2 99%    BMI 43.53 kg/m2     Physical Exam:   General appearance - alert, well appearing, and in no distress  Mental status - alert, oriented to person, place, and time  EYE-EVELIA, EOMI, corneas normal, no foreign bodies  ENT-ENT exam normal, no neck nodes or sinus tenderness  Nose - normal and patent, no erythema, discharge or polyps  Mouth - mucous membranes moist, pharynx normal without lesions  Neck - supple, no significant adenopathy   Chest - clear to auscultation, no wheezes, rales or rhonchi, symmetric air entry   Heart - normal rate, regular rhythm, normal S1, S2, no murmurs, rubs, clicks or gallops   Abdomen - soft, nontender, nondistended, no masses or organomegaly  Lymph- no adenopathy palpable  Ext-peripheral pulses normal, no pedal edema, no clubbing or cyanosis  Skin-Warm and dry. no hyperpigmentation, vitiligo, or suspicious lesions  Neuro -alert, oriented, normal speech, no focal findings or movement disorder noted  Neck-normal C-spine, no tenderness, full ROM without pain  Feet-no nail deformities or callus formation with good pulses noted      Results for orders placed or performed in visit on 11/10/17   AMB POC LIPID PROFILE   Result Value Ref Range    Cholesterol (POC) 243     Triglycerides (POC) 209     HDL Cholesterol (POC) 55     Non-HDL Goal (POC) 188     LDL Cholesterol (POC) 146 MG/DL    TChol/HDL Ratio (POC) 4.4        Assessment/Plan:    ICD-10-CM ICD-9-CM    1. Hypercholesteremia E78.00 272.0 AMB POC LIPID PROFILE      atorvastatin (LIPITOR) 40 mg tablet   2. Medicare annual wellness visit, subsequent Z00.00 V70.0    3. Chronic nonseasonal allergic rhinitis due to pollen J30.89 477.0 fluticasone (FLONASE) 50 mcg/actuation nasal spray      loratadine (CLARITIN) 10 mg tablet   4. Essential hypertension I10 401.9 losartan (COZAAR) 100 mg tablet     Orders Placed This Encounter    AMB POC LIPID PROFILE    fluticasone (FLONASE) 50 mcg/actuation nasal spray     Si Sprays by Both Nostrils route daily.      Dispense:  1 Bottle     Refill:  12    loratadine (CLARITIN) 10 mg tablet Sig: Take 1 Tab by mouth daily. Dispense:  30 Tab     Refill:  12    losartan (COZAAR) 100 mg tablet     Sig: Take 1 Tab by mouth daily. Dispense:  30 Tab     Refill:  12    atorvastatin (LIPITOR) 40 mg tablet     Sig: Take 1 Tab by mouth daily. Dispense:  30 Tab     Refill:  12     lose weight, increase physical activity, follow low fat diet, follow low salt diet  Patient Instructions   [employer].harPolySuite Activation    Thank you for requesting access to [facility]. Please follow the instructions below to securely access and download your online medical record. [facility] allows you to send messages to your doctor, view your test results, renew your prescriptions, schedule appointments, and more. How Do I Sign Up? 1. In your internet browser, go to [email or web address]  2. Click on the First Time User? Click Here link in the Sign In box. You will be redirect to the New Member Sign Up page. 3. Enter your [facility] Access Code exactly as it appears below. You will not need to use this code after youve completed the sign-up process. If you do not sign up before the expiration date, you must request a new code. [facility] Access Code: Activation code not generated  Current [facility] Status: Patient Declined (This is the date your [facility] access code will )    4. Enter the last four digits of your Social Security Number (xxxx) and Date of Birth (mm/dd/yyyy) as indicated and click Submit. You will be taken to the next sign-up page. 5. Create a [facility] ID. This will be your [facility] login ID and cannot be changed, so think of one that is secure and easy to remember. 6. Create a [facility] password. You can change your password at any time. 7. Enter your Password Reset Question and Answer. This can be used at a later time if you forget your password. 8. Enter your e-mail address. You will receive e-mail notification when new information is available in [address] 9. Click Sign Up.  You can now view and download portions of your medical record. 10. Click the Download Summary menu link to download a portable copy of your medical information. Additional Information    If you have questions, please visit the Frequently Asked Questions section of the [facility] website at [email or web address] Remember, [facility] is NOT to be used for urgent needs. For medical emergencies, dial 911. Follow-up Disposition:  Return in about 3 months (around 2/10/2018), or if symptoms worsen or fail to improve. I have reviewed with the patient details of the assessment and plan and all questions were answered. Relevent patient education was performed    An After Visit Summary was printed and given to the patient.

## 2017-11-10 NOTE — PATIENT INSTRUCTIONS
MyCare[employer] Rack Activation    Thank you for requesting access to [facility]. Please follow the instructions below to securely access and download your online medical record. [facility] allows you to send messages to your doctor, view your test results, renew your prescriptions, schedule appointments, and more. How Do I Sign Up? 1. In your internet browser, go to [email or web address]  2. Click on the First Time User? Click Here link in the Sign In box. You will be redirect to the New Member Sign Up page. 3. Enter your [facility] Access Code exactly as it appears below. You will not need to use this code after youve completed the sign-up process. If you do not sign up before the expiration date, you must request a new code. [facility] Access Code: Activation code not generated  Current [facility] Status: Patient Declined (This is the date your [facility] access code will )    4. Enter the last four digits of your Social Security Number (xxxx) and Date of Birth (mm/dd/yyyy) as indicated and click Submit. You will be taken to the next sign-up page. 5. Create a [facility] ID. This will be your [facility] login ID and cannot be changed, so think of one that is secure and easy to remember. 6. Create a [facility] password. You can change your password at any time. 7. Enter your Password Reset Question and Answer. This can be used at a later time if you forget your password. 8. Enter your e-mail address. You will receive e-mail notification when new information is available in [address] 9. Click Sign Up. You can now view and download portions of your medical record. 10. Click the Download Summary menu link to download a portable copy of your medical information. Additional Information    If you have questions, please visit the Frequently Asked Questions section of the [facility] website at [email or web address] Remember, [facility] is NOT to be used for urgent needs. For medical emergencies, dial 911.

## 2017-11-10 NOTE — MR AVS SNAPSHOT
Visit Information Date & Time Provider Department Dept. Phone Encounter #  
 11/10/2017  9:15 AM Faiza Miller MD [address] [place] [address] [phone] [id number] Follow-up Instructions Return in about 3 months (around 2/10/2018), or if symptoms worsen or fail to improve. Follow-up and Disposition History Upcoming Health Maintenance Date Due  
 PAP AKA CERVICAL CYTOLOGY 4/17/2001 Influenza Age 5 to Adult 11/23/2017* DTaP/Tdap/Td series (1 - Tdap) 1/24/2019* MEDICARE YEARLY EXAM 11/11/2018 *Topic was postponed. The date shown is not the original due date. Allergies as of 11/10/2017  Review Complete On: 11/10/2017 By: Faiza Miller MD  
 Not on File Current Immunizations  Never Reviewed No immunizations on file. Not reviewed this visit You Were Diagnosed With   
  
 Codes Comments Hypercholesteremia    -  Primary ICD-10-CM: E78.00 ICD-9-CM: 272.0 Medicare annual wellness visit, subsequent     ICD-10-CM: Z00.00 ICD-9-CM: V70.0 Chronic nonseasonal allergic rhinitis due to pollen     ICD-10-CM: J30.89 ICD-9-CM: 477.0 Essential hypertension     ICD-10-CM: I10 
ICD-9-CM: 401.9 Vitals BP Pulse Temp Resp Height(growth percentile) Weight(growth percentile) (!) 140/100 80 98 °F (36.7 °C) (Oral) 16 5' 2\" (1.575 m) 238 lb (108 kg) SpO2 BMI Smoking Status 99% 43.53 kg/m2 Former Smoker Vitals History BMI and BSA Data Body Mass Index Body Surface Area  
 43.53 kg/m 2 2.17 m 2 Preferred Pharmacy Pharmacy Name Phone [facility], [phone] [phone] Your Updated Medication List  
  
   
This list is accurate as of: 11/10/17 10:33 AM.  Always use your most recent med list.  
  
  
  
  
 atorvastatin 40 mg tablet Commonly known as:  LIPITOR Take 1 Tab by mouth daily. clindamycin 1 % lotion Commonly known as:  CLEOCIN T  
  
 cyclobenzaprine 10 mg tablet Commonly known as:  FLEXERIL Take 1 Tab by mouth two (2) times a day. fluticasone 50 mcg/actuation nasal spray Commonly known as:  Lennice Boss 2 Sprays by Both Nostrils route daily. gabapentin 100 mg capsule Commonly known as:  NEURONTIN Si tab daily 2 hours before bedtime  
  
 ibuprofen 800 mg tablet Commonly known as:  MOTRIN Take 1 Tab by mouth two (2) times daily (after meals). loratadine 10 mg tablet Commonly known as:  Leti Kerline Take 1 Tab by mouth daily. losartan 100 mg tablet Commonly known as:  COZAAR Take 1 Tab by mouth daily. prednisoLONE acetate 1 % ophthalmic suspension Commonly known as:  PRED FORTE Prescriptions Sent to Pharmacy Refills  
 fluticasone (FLONASE) 50 mcg/actuation nasal spray 12 Si Sprays by Both Nostrils route daily. Class: Normal  
 Pharmacy: [address] Ph #: [phone] Route: Both Nostrils  
 loratadine (CLARITIN) 10 mg tablet 12 Sig: Take 1 Tab by mouth daily. Class: Normal  
 Pharmacy: [address] Ph #: [phone] Route: Oral  
 atorvastatin (LIPITOR) 40 mg tablet 12 Sig: Take 1 Tab by mouth daily. Class: Normal  
 Pharmacy: [address] Ph #: [phone] Route: Oral  
  
We Performed the Following AMB POC LIPID PROFILE [57048 CPT(R)] Follow-up Instructions Return in about 3 months (around 2/10/2018), or if symptoms worsen or fail to improve. Patient Instructions MyChart Activation Thank you for requesting access to Biopharmacopae. Please follow the instructions below to securely access and download your online medical record. Biopharmacopae allows you to send messages to your doctor, view your test results, renew your prescriptions, schedule appointments, and more. How Do I Sign Up? 1. In your internet browser, go to [email or web address] 
2. Click on the First Time User? Click Here link in the Sign In box. You will be redirect to the New Member Sign Up page. 3. Enter your Biopharmacopae Access Code exactly as it appears below. You will not need to use this code after youve completed the sign-up process. If you do not sign up before the expiration date, you must request a new code. Videregent Access Code: Activation code not generated Current Biopharmacopae Status: Patient Declined (This is the date your Biopharmacopae access code will ) 4. Enter the last four digits of your Social Security Number (xxxx) and Date of Birth (mm/dd/yyyy) as indicated and click Submit. You will be taken to the next sign-up page. 5. Create a Biopharmacopae ID. This will be your Biopharmacopae login ID and cannot be changed, so think of one that is secure and easy to remember. 6. Create a Biopharmacopae password. You can change your password at any time. 7. Enter your Password Reset Question and Answer. This can be used at a later time if you forget your password. 8. Enter your e-mail address. You will receive e-mail notification when new information is available in [address] 9. Click Sign Up. You can now view and download portions of your medical record. 10. Click the Download Summary menu link to download a portable copy of your medical information. Additional Information If you have questions, please visit the Frequently Asked Questions section of the Biopharmacopae website at [email or web address] Remember, Biopharmacopae is NOT to be used for urgent needs. For medical emergencies, dial 911. Please provide this summary of care documentation to your next provider. Your primary care clinician is listed as José Peraza. If you have any questions after today's visit, please call [phone].

## 2017-11-13 DIAGNOSIS — I10 ESSENTIAL HYPERTENSION: ICD-10-CM

## 2017-11-14 RX ORDER — LOSARTAN POTASSIUM 100 MG/1
100 TABLET ORAL DAILY
Qty: 30 TAB | Refills: 12 | Status: SHIPPED | OUTPATIENT
Start: 2017-11-14 | End: 2018-10-19 | Stop reason: SDUPTHER

## 2017-12-04 DIAGNOSIS — J30.89 CHRONIC NONSEASONAL ALLERGIC RHINITIS DUE TO POLLEN: ICD-10-CM

## 2017-12-04 DIAGNOSIS — E78.00 HYPERCHOLESTEREMIA: ICD-10-CM

## 2017-12-05 RX ORDER — FLUTICASONE PROPIONATE 50 MCG
SPRAY, SUSPENSION (ML) NASAL
Qty: 1 BOTTLE | Refills: 12 | Status: SHIPPED | OUTPATIENT
Start: 2017-12-05

## 2017-12-05 RX ORDER — ATORVASTATIN CALCIUM 40 MG/1
TABLET, FILM COATED ORAL
Qty: 90 TAB | Refills: 12 | Status: SHIPPED | OUTPATIENT
Start: 2017-12-05 | End: 2018-04-25 | Stop reason: SDUPTHER

## 2018-01-17 RX ORDER — GABAPENTIN 100 MG/1
CAPSULE ORAL
Qty: 90 CAP | Refills: 0 | Status: SHIPPED | OUTPATIENT
Start: 2018-01-17 | End: 2018-04-13 | Stop reason: SDUPTHER

## 2018-02-19 ENCOUNTER — OFFICE VISIT (OUTPATIENT)
Dept: INTERNAL MEDICINE CLINIC | Age: 38
End: 2018-02-19

## 2018-02-19 VITALS
HEART RATE: 97 BPM | TEMPERATURE: 98.4 F | SYSTOLIC BLOOD PRESSURE: 114 MMHG | DIASTOLIC BLOOD PRESSURE: 88 MMHG | RESPIRATION RATE: 16 BRPM | BODY MASS INDEX: 42.69 KG/M2 | WEIGHT: 232 LBS | HEIGHT: 62 IN | OXYGEN SATURATION: 98 %

## 2018-02-19 DIAGNOSIS — E78.00 HYPERCHOLESTEREMIA: Primary | ICD-10-CM

## 2018-02-19 DIAGNOSIS — I10 ESSENTIAL HYPERTENSION: ICD-10-CM

## 2018-02-19 DIAGNOSIS — J30.89 CHRONIC NONSEASONAL ALLERGIC RHINITIS DUE TO POLLEN: ICD-10-CM

## 2018-02-19 PROBLEM — E66.01 OBESITY, MORBID (HCC): Status: ACTIVE | Noted: 2018-02-19

## 2018-02-19 LAB
CHOLEST SERPL-MCNC: 158 MG/DL
HDLC SERPL-MCNC: 42 MG/DL
LDL CHOLESTEROL POC: 91 MG/DL
NON-HDL CHOLESTEROL, 011976: 114
TCHOL/HDL RATIO (POC): 3.7
TRIGL SERPL-MCNC: 111 MG/DL

## 2018-02-19 NOTE — PROGRESS NOTES
Theodore Goyal is a 40 y.o. female and presents with Cholesterol Problem and Hypertension  . Subjective:  Hypertension Review:  The patient has hypertension . Diet and Lifestyle: generally follows a low sodium diet, exercises sporadically  Home BP Monitoring: is not measured at home. Pertinent ROS: taking medications as instructed, no medication side effects noted, no TIA's, no chest pain on exertion, no dyspnea on exertion, no swelling of ankles. Dyslipidemia Review:  Patient presents for evaluation of lipids. Compliance with treatment thus far has been excellent. A repeat fasting lipid profile was done. The patient does not use medications that may worsen dyslipidemias . The patient exercises sporadically. The patient is not known to have coexisting coronary artery disease    Allergic Rhinitis  Patient presents for evaluation of allergic symptoms. denies nasal congestion, rhinorrhea, sneezing, eye itching, watery eyes. Precipitants haved included possible pollen. Review of Systems  Constitutional: negative for fevers, chills, anorexia and weight loss  Eyes:   negative for visual disturbance and irritation  ENT:   negative for tinnitus,sore throat,nasal congestion,ear pains. hoarseness  Respiratory:  negative for cough, hemoptysis, dyspnea,wheezing  CV:   negative for chest pain, palpitations, lower extremity edema  GI:   negative for nausea, vomiting, diarrhea, abdominal pain,melena  Endo:               negative for polyuria,polydipsia,polyphagia,heat intolerance  Genitourinary: negative for frequency, dysuria and hematuria  Integument:  negative for rash and pruritus  Hematologic:  negative for easy bruising and gum/nose bleeding  Musculoskel: negative for myalgias, arthralgias, back pain, muscle weakness, joint pain  Neurological:  negative for headaches, dizziness, vertigo, memory problems and gait   Behavl/Psych: negative for feelings of anxiety, depression, mood changes    History reviewed. No pertinent past medical history. Past Surgical History:   Procedure Laterality Date    HX HEENT  08/2016    cornea transplant    HX HEENT  2015    cornea transplant     Social History     Social History    Marital status:      Spouse name: N/A    Number of children: N/A    Years of education: N/A     Social History Main Topics    Smoking status: Former Smoker    Smokeless tobacco: Never Used    Alcohol use No    Drug use: No    Sexual activity: Yes     Partners: Male     Other Topics Concern    None     Social History Narrative     Family History   Problem Relation Age of Onset    Hypertension Mother     Hypertension Father     Diabetes Father      Current Outpatient Prescriptions   Medication Sig Dispense Refill    gabapentin (NEURONTIN) 100 mg capsule TAKE 1 CAPSULE BY MOUTH EVERY NIGHT 2 HOURS BEFORE BEDTIME 90 Cap 0    atorvastatin (LIPITOR) 40 mg tablet TAKE 1 TABLET BY MOUTH DAILY 90 Tab 12    fluticasone (FLONASE) 50 mcg/actuation nasal spray INSTILL 2 SPRAYS IN EACH NOSTRIL EVERY DAY 1 Bottle 12    losartan (COZAAR) 100 mg tablet Take 1 Tab by mouth daily. 30 Tab 12    loratadine (CLARITIN) 10 mg tablet Take 1 Tab by mouth daily. 30 Tab 12    clindamycin (CLEOCIN T) 1 % lotion       ibuprofen (MOTRIN) 800 mg tablet Take 1 Tab by mouth two (2) times daily (after meals). 60 Tab 12    cyclobenzaprine (FLEXERIL) 10 mg tablet Take 1 Tab by mouth two (2) times a day.  60 Tab 12     Not on File    Objective:  Visit Vitals    /88    Pulse 97    Temp 98.4 °F (36.9 °C) (Oral)    Resp 16    Ht 5' 2\" (1.575 m)    Wt 232 lb (105.2 kg)    SpO2 98%    BMI 42.43 kg/m2     Physical Exam:   General appearance - alert, well appearing, and in no distress  Mental status - alert, oriented to person, place, and time  EYE-EVELIA, EOMI, corneas normal, no foreign bodies  ENT-ENT exam normal, no neck nodes or sinus tenderness  Nose - normal and patent, no erythema, discharge or polyps  Mouth - mucous membranes moist, pharynx normal without lesions  Neck - supple, no significant adenopathy   Chest - clear to auscultation, no wheezes, rales or rhonchi, symmetric air entry   Heart - normal rate, regular rhythm, normal S1, S2, no murmurs, rubs, clicks or gallops   Abdomen - soft, nontender, nondistended, no masses or organomegaly  Lymph- no adenopathy palpable  Ext-peripheral pulses normal, no pedal edema, no clubbing or cyanosis  Skin-Warm and dry. no hyperpigmentation, vitiligo, or suspicious lesions  Neuro -alert, oriented, normal speech, no focal findings or movement disorder noted  Neck-normal C-spine, no tenderness, full ROM without pain  Feet-no nail deformities or callus formation with good pulses noted      Results for orders placed or performed in visit on 11/10/17   AMB POC LIPID PROFILE   Result Value Ref Range    Cholesterol (POC) 243     Triglycerides (POC) 209     HDL Cholesterol (POC) 55     Non-HDL Goal (POC) 188     LDL Cholesterol (POC) 146 MG/DL    TChol/HDL Ratio (POC) 4.4        Assessment/Plan:    ICD-10-CM ICD-9-CM    1. Hypercholesteremia E78.00 272.0 AMB POC LIPID PROFILE   2. Essential hypertension I10 401.9 AMB POC LIPID PROFILE   3. Chronic nonseasonal allergic rhinitis due to pollen J30.89 477.0      Orders Placed This Encounter    AMB POC LIPID PROFILE     lose weight, increase physical activity, follow low fat diet, follow low salt diet, continue present plan,Take 81mg aspirin daily  Patient Instructions   InfoDifharOceana Activation    Thank you for requesting access to Wugly. Please follow the instructions below to securely access and download your online medical record. Wugly allows you to send messages to your doctor, view your test results, renew your prescriptions, schedule appointments, and more. How Do I Sign Up? 1. In your internet browser, go to [email or web address]  2. Click on the First Time User? Click Here link in the Sign In box.  You will be redirect to the New Member Sign Up page. 3. Enter your Synacort Access Code exactly as it appears below. You will not need to use this code after youve completed the sign-up process. If you do not sign up before the expiration date, you must request a new code. MyChart Access Code: Activation code not generated  Current Falafel Games Status: Patient Declined (This is the date your MyChart access code will )    4. Enter the last four digits of your Social Security Number (xxxx) and Date of Birth (mm/dd/yyyy) as indicated and click Submit. You will be taken to the next sign-up page. 5. Create a Synacort ID. This will be your Falafel Games login ID and cannot be changed, so think of one that is secure and easy to remember. 6. Create a Synacort password. You can change your password at any time. 7. Enter your Password Reset Question and Answer. This can be used at a later time if you forget your password. 8. Enter your e-mail address. You will receive e-mail notification when new information is available in [address] 9. Click Sign Up. You can now view and download portions of your medical record. 10. Click the Download Summary menu link to download a portable copy of your medical information. Additional Information    If you have questions, please visit the Frequently Asked Questions section of the Falafel Games website at [email or web address] Remember, Falafel Games is NOT to be used for urgent needs. For medical emergencies, dial 911. Follow-up Disposition:  Return in about 6 months (around 2018), or if symptoms worsen or fail to improve. I have reviewed with the patient details of the assessment and plan and all questions were answered. Relevent patient education was performed. The most recent lab findings were reviewed with the patient. An After Visit Summary was printed and given to the patient.

## 2018-02-19 NOTE — PATIENT INSTRUCTIONS
ProcyrionharNitroSecurity Activation    Thank you for requesting access to Prot-On. Please follow the instructions below to securely access and download your online medical record. Prot-On allows you to send messages to your doctor, view your test results, renew your prescriptions, schedule appointments, and more. How Do I Sign Up? 1. In your internet browser, go to [email or web address]  2. Click on the First Time User? Click Here link in the Sign In box. You will be redirect to the New Member Sign Up page. 3. Enter your Prot-On Access Code exactly as it appears below. You will not need to use this code after youve completed the sign-up process. If you do not sign up before the expiration date, you must request a new code. Prot-On Access Code: Activation code not generated  Current Prot-On Status: Patient Declined (This is the date your Prot-On access code will )    4. Enter the last four digits of your Social Security Number (xxxx) and Date of Birth (mm/dd/yyyy) as indicated and click Submit. You will be taken to the next sign-up page. 5. Create a Prot-On ID. This will be your Prot-On login ID and cannot be changed, so think of one that is secure and easy to remember. 6. Create a Prot-On password. You can change your password at any time. 7. Enter your Password Reset Question and Answer. This can be used at a later time if you forget your password. 8. Enter your e-mail address. You will receive e-mail notification when new information is available in [address] 9. Click Sign Up. You can now view and download portions of your medical record. 10. Click the Download Summary menu link to download a portable copy of your medical information. Additional Information    If you have questions, please visit the Frequently Asked Questions section of the Prot-On website at [email or web address] Remember, Prot-On is NOT to be used for urgent needs. For medical emergencies, dial 911.

## 2018-02-19 NOTE — PROGRESS NOTES
1. Have you been to the ER, urgent care clinic since your last visit? Hospitalized since your last visit? yes      2. Have you seen or consulted any other health care providers outside of the [address] [place] [address] since your last visit? Include any pap smears or colon screening.  no

## 2018-04-13 RX ORDER — GABAPENTIN 100 MG/1
CAPSULE ORAL
Qty: 90 CAP | Refills: 0 | Status: SHIPPED | OUTPATIENT
Start: 2018-04-13 | End: 2018-07-13 | Stop reason: SDUPTHER

## 2018-04-25 DIAGNOSIS — E78.00 HYPERCHOLESTEREMIA: ICD-10-CM

## 2018-04-26 RX ORDER — ATORVASTATIN CALCIUM 40 MG/1
TABLET, FILM COATED ORAL
Qty: 90 TAB | Refills: 12 | Status: SHIPPED | OUTPATIENT
Start: 2018-04-26 | End: 2019-06-21 | Stop reason: SDUPTHER

## 2018-07-16 RX ORDER — GABAPENTIN 100 MG/1
CAPSULE ORAL
Qty: 90 CAP | Refills: 0 | Status: SHIPPED | OUTPATIENT
Start: 2018-07-16 | End: 2018-09-29 | Stop reason: SDUPTHER

## 2018-07-16 RX ORDER — IBUPROFEN 800 MG/1
TABLET ORAL
Qty: 60 TAB | Refills: 0 | Status: SHIPPED | OUTPATIENT
Start: 2018-07-16 | End: 2018-08-17 | Stop reason: SDUPTHER

## 2018-08-20 RX ORDER — IBUPROFEN 800 MG/1
TABLET ORAL
Qty: 60 TAB | Refills: 0 | Status: SHIPPED | OUTPATIENT
Start: 2018-08-20 | End: 2018-09-14 | Stop reason: SDUPTHER

## 2018-09-03 RX ORDER — CYCLOBENZAPRINE HCL 10 MG
TABLET ORAL
Qty: 60 TAB | Refills: 0 | Status: SHIPPED | OUTPATIENT
Start: 2018-09-03 | End: 2018-10-04 | Stop reason: SDUPTHER

## 2018-09-14 RX ORDER — IBUPROFEN 800 MG/1
TABLET ORAL
Qty: 60 TAB | Refills: 0 | Status: SHIPPED | COMMUNITY
Start: 2018-09-14 | End: 2018-09-29 | Stop reason: SDUPTHER

## 2018-10-01 RX ORDER — GABAPENTIN 100 MG/1
CAPSULE ORAL
Qty: 90 CAP | Refills: 0 | Status: SHIPPED | OUTPATIENT
Start: 2018-10-01 | End: 2018-10-02 | Stop reason: SDUPTHER

## 2018-10-01 RX ORDER — IBUPROFEN 800 MG/1
TABLET ORAL
Qty: 60 TAB | Refills: 0 | Status: SHIPPED | OUTPATIENT
Start: 2018-10-01 | End: 2018-10-02 | Stop reason: SDUPTHER

## 2018-10-03 RX ORDER — IBUPROFEN 800 MG/1
TABLET ORAL
Qty: 60 TAB | Refills: 0 | Status: SHIPPED | OUTPATIENT
Start: 2018-10-03 | End: 2018-11-12 | Stop reason: SDUPTHER

## 2018-10-03 RX ORDER — GABAPENTIN 100 MG/1
CAPSULE ORAL
Qty: 90 CAP | Refills: 0 | Status: SHIPPED | OUTPATIENT
Start: 2018-10-03 | End: 2019-06-21 | Stop reason: SDUPTHER

## 2018-10-05 RX ORDER — CYCLOBENZAPRINE HCL 10 MG
TABLET ORAL
Qty: 60 TAB | Refills: 0 | Status: SHIPPED | OUTPATIENT
Start: 2018-10-05 | End: 2018-12-25 | Stop reason: SDUPTHER

## 2018-10-19 DIAGNOSIS — I10 ESSENTIAL HYPERTENSION: ICD-10-CM

## 2018-10-23 RX ORDER — LOSARTAN POTASSIUM 100 MG/1
TABLET ORAL
Qty: 90 TAB | Refills: 0 | Status: SHIPPED | OUTPATIENT
Start: 2018-10-23 | End: 2018-11-12 | Stop reason: SDUPTHER

## 2018-11-12 ENCOUNTER — OFFICE VISIT (OUTPATIENT)
Dept: INTERNAL MEDICINE CLINIC | Age: 38
End: 2018-11-12

## 2018-11-12 VITALS
RESPIRATION RATE: 16 BRPM | HEART RATE: 62 BPM | SYSTOLIC BLOOD PRESSURE: 136 MMHG | OXYGEN SATURATION: 97 % | HEIGHT: 62 IN | WEIGHT: 238 LBS | DIASTOLIC BLOOD PRESSURE: 90 MMHG | BODY MASS INDEX: 43.79 KG/M2 | TEMPERATURE: 98 F

## 2018-11-12 DIAGNOSIS — Z00.00 MEDICARE ANNUAL WELLNESS VISIT, SUBSEQUENT: ICD-10-CM

## 2018-11-12 DIAGNOSIS — M15.8 OTHER OSTEOARTHRITIS INVOLVING MULTIPLE JOINTS: ICD-10-CM

## 2018-11-12 DIAGNOSIS — I10 ESSENTIAL HYPERTENSION: ICD-10-CM

## 2018-11-12 DIAGNOSIS — E78.00 HYPERCHOLESTEREMIA: Primary | ICD-10-CM

## 2018-11-12 LAB
CHOLEST SERPL-MCNC: 143 MG/DL
HDLC SERPL-MCNC: 56 MG/DL
LDL CHOLESTEROL POC: 72 MG/DL
NON-HDL CHOLESTEROL, 011976: 87
TCHOL/HDL RATIO (POC): 2.6
TRIGL SERPL-MCNC: 75 MG/DL

## 2018-11-12 RX ORDER — CYCLOBENZAPRINE HCL 10 MG
TABLET ORAL
Qty: 60 TAB | Refills: 0 | Status: CANCELLED | OUTPATIENT
Start: 2018-11-12

## 2018-11-12 RX ORDER — IBUPROFEN 800 MG/1
TABLET ORAL
Qty: 180 TAB | Refills: 3 | Status: SHIPPED | OUTPATIENT
Start: 2018-11-12 | End: 2019-07-08 | Stop reason: SDUPTHER

## 2018-11-12 RX ORDER — LOSARTAN POTASSIUM 100 MG/1
TABLET ORAL
Qty: 90 TAB | Refills: 3 | Status: SHIPPED | OUTPATIENT
Start: 2018-11-12 | End: 2020-01-07

## 2018-11-12 RX ORDER — AMLODIPINE BESYLATE 5 MG/1
5 TABLET ORAL DAILY
Qty: 90 TAB | Refills: 3 | Status: SHIPPED | OUTPATIENT
Start: 2018-11-12 | End: 2018-11-12 | Stop reason: SDUPTHER

## 2018-11-12 RX ORDER — IBUPROFEN 800 MG/1
TABLET ORAL
Qty: 60 TAB | Refills: 0 | Status: SHIPPED | OUTPATIENT
Start: 2018-11-12 | End: 2018-11-12 | Stop reason: SDUPTHER

## 2018-11-12 RX ORDER — LOSARTAN POTASSIUM 100 MG/1
TABLET ORAL
Qty: 90 TAB | Refills: 3 | Status: SHIPPED | OUTPATIENT
Start: 2018-11-12 | End: 2018-11-12 | Stop reason: SDUPTHER

## 2018-11-12 RX ORDER — AMLODIPINE BESYLATE 5 MG/1
5 TABLET ORAL DAILY
Qty: 90 TAB | Refills: 3 | Status: SHIPPED | OUTPATIENT
Start: 2018-11-12 | End: 2019-10-06 | Stop reason: SDUPTHER

## 2018-11-12 NOTE — PATIENT INSTRUCTIONS
Oceana Activation Thank you for requesting access to Oceana. Please follow the instructions below to securely access and download your online medical record. Oceana allows you to send messages to your doctor, view your test results, renew your prescriptions, schedule appointments, and more. How Do I Sign Up? 1. In your internet browser, go to [email or web address] 
2. Click on the First Time User? Click Here link in the Sign In box. You will be redirect to the New Member Sign Up page. 3. Enter your Oceana Access Code exactly as it appears below. You will not need to use this code after youve completed the sign-up process. If you do not sign up before the expiration date, you must request a new code. Oceana Access Code: [email or web address] Expires: 2/10/2019  9:04 AM (This is the date your Oceana access code will ) 4. Enter the last four digits of your Social Security Number (xxxx) and Date of Birth (mm/dd/yyyy) as indicated and click Submit. You will be taken to the next sign-up page. 5. Create a Oceana ID. This will be your Oceana login ID and cannot be changed, so think of one that is secure and easy to remember. 6. Create a Oceana password. You can change your password at any time. 7. Enter your Password Reset Question and Answer. This can be used at a later time if you forget your password. 8. Enter your e-mail address. You will receive e-mail notification when new information is available in [address] 9. Click Sign Up. You can now view and download portions of your medical record. 10. Click the Download Summary menu link to download a portable copy of your medical information. Additional Information If you have questions, please visit the Frequently Asked Questions section of the Oceana website at [email or web address] Remember, Oceana is NOT to be used for urgent needs. For medical emergencies, dial 911.

## 2018-11-12 NOTE — PROGRESS NOTES
Heena Guevara is a 45 y.o. female and presents with Annual Wellness Visit; Cholesterol Problem; and Hypertension Karine Chen Subjective: She has a history of having corneal transplant She has on occasions diffuse joint pains Hypertension Review: 
The patient has essential hypertension Diet and Lifestyle: generally follows a  low sodium diet, exercises sporadically Home BP Monitoring: is not measured at home. Pertinent ROS: taking medications as instructed, no medication side effects noted, no TIA's, no chest pain on exertion, no dyspnea on exertion, no swelling of ankles. Dyslipidemia Review: 
Patient presents for evaluation of lipids. Compliance with treatment thus far has been excellent. A repeat fasting lipid profile was done. The patient does not use medications that may worsen dyslipidemias (corticosteroids, progestins, anabolic steroids, diuretics, beta-blockers, amiodarone, cyclosporine, olanzapine). The patient exercises some She needs an annual exam. 
 
 
Heena Guevara is a 45 y.o. female and presents for annual Medicare Wellness Visit. Problem List: Reviewed with patient and discussed risk factors. Patient Active Problem List  
Diagnosis Code  ERRONEOUS ENCOUNTER--DISREGARD  Obesity, morbid ([facility] [address] E66.01  
 
 
Current medical providers:  Patient Care Team: 
Mik Agrawal MD as PCP - General (Internal Medicine) PSH: Reviewed with patient Past Surgical History:  
Procedure Laterality Date  HX HEENT  08/2016  
 cornea transplant  HX HEENT  2015  
 cornea transplant SH: Reviewed with patient Social History Tobacco Use  Smoking status: Former Smoker  Smokeless tobacco: Never Used Substance Use Topics  Alcohol use: No  
 Drug use: No  
 
 
FH: Reviewed with patient Family History Problem Relation Age of Onset  Hypertension Mother  Hypertension Father  Diabetes Father Medications/Allergies: Reviewed with patient Current Outpatient Medications on File Prior to Visit Medication Sig Dispense Refill  cyclobenzaprine (FLEXERIL) 10 mg tablet TAKE 1 TABLET BY MOUTH TWICE DAILY 60 Tab 0  
 gabapentin (NEURONTIN) 100 mg capsule TAKE 1 CAPSULE BY MOUTH EVERY NIGHT 2 HOURS BEFORE BEDTIME 90 Cap 0  
 atorvastatin (LIPITOR) 40 mg tablet TAKE 1 TABLET BY MOUTH DAILY 90 Tab 12  
 fluticasone (FLONASE) 50 mcg/actuation nasal spray INSTILL 2 SPRAYS IN EACH NOSTRIL EVERY DAY 1 Bottle 12  
 loratadine (CLARITIN) 10 mg tablet Take 1 Tab by mouth daily. 30 Tab 12  
 clindamycin (CLEOCIN T) 1 % lotion No current facility-administered medications on file prior to visit. Not on File Objective: 
Visit Vitals Ht 5' 2\" (1.575 m) BMI 42.43 kg/m² Body mass index is 42.43 kg/m². Assessment of cognitive impairment: Alert and oriented x 3 Depression Screen: PHQ over the last two weeks 11/12/2018 PHQ Not Done Medical Reason (indicate in comments) Little interest or pleasure in doing things Not at all Feeling down, depressed, irritable, or hopeless Not at all Total Score PHQ 2 0 Fall Risk Assessment:  No flowsheet data found. Functional Ability:  
Does the patient exhibit a steady gait? yes How long did it take the patient to get up and walk from a sitting position? seconds Is the patient self reliant?  (ie can do own laundry, meals, household chores)  yes Does the patient handle his/her own medications? yes Does the patient handle his/her own money? yes Is the patients home safe (ie good lighting, handrails on stairs and bath, etc.)? yes Did you notice or did patient express any hearing difficulties? yes Did you notice or did patient express any vision difficulties? yes Were distance and reading eye charts used? no 
  
 
Advance Care Planning:  
Patient was offered the opportunity to discuss advance care planning:  yes Does patient have an Advance Directive:  yes If no, did you provide information on [facility]ctions? yes Plan:   
 
Orders Placed This Encounter  AMB POC LIPID PROFILE  
 ibuprofen (MOTRIN) 800 mg tablet  losartan (COZAAR) 100 mg tablet  amLODIPine (NORVASC) 5 mg tablet Health Maintenance Topic Date Due  
 PAP AKA CERVICAL CYTOLOGY  04/17/2001  DTaP/Tdap/Td series (1 - Tdap) 01/24/2019 (Originally 4/17/2001)  Influenza Age 5 to Adult  04/26/2019 (Originally 8/1/2018)  MEDICARE YEARLY EXAM  11/13/2019 *Patient verbalized understanding and agreement with the plan. A copy of the After Visit Summary with personalized health plan was given to the patient today. Review of Systems Constitutional: negative for fevers, chills, anorexia and weight loss Eyes:   negative for visual disturbance and irritation ENT:   negative for tinnitus,sore throat,nasal congestion,ear pains. hoarseness Respiratory:  negative for cough, hemoptysis, dyspnea,wheezing CV:   negative for chest pain, palpitations, lower extremity edema GI:   negative for nausea, vomiting, diarrhea, abdominal pain,melena Endo:               negative for polyuria,polydipsia,polyphagia,heat intolerance Genitourinary: negative for frequency, dysuria and hematuria Integument:  negative for rash and pruritus Hematologic:  negative for easy bruising and gum/nose bleeding Musculoskel: negative for myalgias, arthralgias, back pain, muscle weakness, joint pain Neurological:  negative for headaches, dizziness, vertigo, memory problems and gait Behavl/Psych: negative for feelings of anxiety, depression, mood changes History reviewed. No pertinent past medical history. Past Surgical History:  
Procedure Laterality Date  HX HEENT  08/2016  
 cornea transplant  HX HEENT  2015  
 cornea transplant Social History Socioeconomic History  Marital status:  Spouse name: Not on file  Number of children: Not on file  Years of education: Not on file  Highest education level: Not on file Social Needs  Financial resource strain: Not on file  Food insecurity - worry: Not on file  Food insecurity - inability: Not on file  Transportation needs - medical: Not on file  Transportation needs - non-medical: Not on file Occupational History  Not on file Tobacco Use  Smoking status: Former Smoker  Smokeless tobacco: Never Used Substance and Sexual Activity  Alcohol use: No  
 Drug use: No  
 Sexual activity: Yes  
  Partners: Male Other Topics Concern  Not on file Social History Narrative  Not on file Family History Problem Relation Age of Onset  Hypertension Mother  Hypertension Father  Diabetes Father Current Outpatient Medications Medication Sig Dispense Refill  ibuprofen (MOTRIN) 800 mg tablet TAKE 1 TABLET BY MOUTH TWICE DAILY AFTER MEALS 60 Tab 0  
 losartan (COZAAR) 100 mg tablet TAKE 1 TABLET BY MOUTH EVERY DAY 90 Tab 3  
 amLODIPine (NORVASC) 5 mg tablet Take 1 Tab by mouth daily. 90 Tab 3  cyclobenzaprine (FLEXERIL) 10 mg tablet TAKE 1 TABLET BY MOUTH TWICE DAILY 60 Tab 0  
 gabapentin (NEURONTIN) 100 mg capsule TAKE 1 CAPSULE BY MOUTH EVERY NIGHT 2 HOURS BEFORE BEDTIME 90 Cap 0  
 atorvastatin (LIPITOR) 40 mg tablet TAKE 1 TABLET BY MOUTH DAILY 90 Tab 12  
 fluticasone (FLONASE) 50 mcg/actuation nasal spray INSTILL 2 SPRAYS IN EACH NOSTRIL EVERY DAY 1 Bottle 12  
 loratadine (CLARITIN) 10 mg tablet Take 1 Tab by mouth daily. 30 Tab 12  
 clindamycin (CLEOCIN T) 1 % lotion Not on File Objective: 
Visit Vitals Ht 5' 2\" (1.575 m) BMI 42.43 kg/m² Physical Exam:  
General appearance - alert, well appearing, and in no distress Mental status - alert, oriented to person, place, and time EYE-EVELIA, EOMI, corneas normal, no foreign bodies ENT-ENT exam normal, no neck nodes or sinus tenderness Nose - normal and patent, no erythema, discharge or polyps Mouth - mucous membranes moist, pharynx normal without lesions Neck - supple, no significant adenopathy Chest - clear to auscultation, no wheezes, rales or rhonchi, symmetric air entry Heart - normal rate, regular rhythm, normal S1, S2, no murmurs, rubs, clicks or gallops Abdomen - soft, nontender, nondistended, no masses or organomegaly Lymph- no adenopathy palpable Ext-peripheral pulses normal, no pedal edema, no clubbing or cyanosis Skin-Warm and dry. no hyperpigmentation, vitiligo, or suspicious lesions Neuro -alert, oriented, normal speech, no focal findings or movement disorder noted Neck-normal C-spine, no tenderness, full ROM without pain Feet-no nail deformities or callus formation with good pulses noted Results for orders placed or performed in visit on 02/19/18 AMB POC LIPID PROFILE Result Value Ref Range Cholesterol (POC) 158 Triglycerides (POC) 111 HDL Cholesterol (POC) 42 Non-HDL Cholesterol 114 LDL Cholesterol (POC) 91 MG/DL TChol/HDL Ratio (POC) 3.7 Assessment/Plan: ICD-10-CM ICD-9-CM 1. Hypercholesteremia E78.00 272.0 ibuprofen (MOTRIN) 800 mg tablet AMB POC LIPID PROFILE 2. Essential hypertension I10 401.9 losartan (COZAAR) 100 mg tablet 3. Medicare annual wellness visit, subsequent Z00.00 V70.0 4. Other osteoarthritis involving multiple joints M15.8 715.89 Orders Placed This Encounter  AMB POC LIPID PROFILE  
 ibuprofen (MOTRIN) 800 mg tablet Sig: TAKE 1 TABLET BY MOUTH TWICE DAILY AFTER MEALS Dispense:  60 Tab Refill:  0  
 losartan (COZAAR) 100 mg tablet Sig: TAKE 1 TABLET BY MOUTH EVERY DAY Dispense:  90 Tab Refill:  3  
 amLODIPine (NORVASC) 5 mg tablet Sig: Take 1 Tab by mouth daily. Dispense:  90 Tab Refill:  3  
 
lose weight, follow low fat diet, follow low salt diet, continue present plan,Take 81mg aspirin daily Patient Instructions MyChart Activation Thank you for requesting access to Finding Something 3. Please follow the instructions below to securely access and download your online medical record. Finding Something 3 allows you to send messages to your doctor, view your test results, renew your prescriptions, schedule appointments, and more. How Do I Sign Up? 1. In your internet browser, go to [email or web address] 
2. Click on the First Time User? Click Here link in the Sign In box. You will be redirect to the New Member Sign Up page. 3. Enter your Finding Something 3 Access Code exactly as it appears below. You will not need to use this code after youve completed the sign-up process. If you do not sign up before the expiration date, you must request a new code. Finding Something 3 Access Code: [email or web address] Expires: 2/10/2019  9:04 AM (This is the date your Finding Something 3 access code will ) 4. Enter the last four digits of your Social Security Number (xxxx) and Date of Birth (mm/dd/yyyy) as indicated and click Submit. You will be taken to the next sign-up page. 5. Create a Finding Something 3 ID. This will be your Finding Something 3 login ID and cannot be changed, so think of one that is secure and easy to remember. 6. Create a Finding Something 3 password. You can change your password at any time. 7. Enter your Password Reset Question and Answer. This can be used at a later time if you forget your password. 8. Enter your e-mail address. You will receive e-mail notification when new information is available in [address]. 9. Click Sign Up. You can now view and download portions of your medical record. 10. Click the Download Summary menu link to download a portable copy of your medical information. Additional Information If you have questions, please visit the Frequently Asked Questions section of the Finding Something 3 website at [email or web address] Remember, Finding Something 3 is NOT to be used for urgent needs. For medical emergencies, dial 911. Follow-up Disposition: 
Return in about 3 months (around 2/12/2019), or if symptoms worsen or fail to improve. I have reviewed with the patient details of the assessment and plan and all questions were answered. Relevent patient education was performed. The most recent lab findings were reviewed with the patient. An After Visit Summary was printed and given to the patient.

## 2018-11-12 NOTE — PROGRESS NOTES
1. Have you been to the ER, urgent care clinic since your last visit? Hospitalized since your last visit? NO 
2. Have you seen or consulted any other health care providers outside of the [address] [place] [address] since your last visit? Include any pap smears or colon screening. NO 
 
PHQ over the last two weeks 11/12/2018 PHQ Not Done - Little interest or pleasure in doing things Not at all Feeling down, depressed, irritable, or hopeless Not at all Total Score PHQ 2 0 Chief Complaint Patient presents with [address] [place] [address] Annual Wellness Visit  Cholesterol Problem  Hypertension Per Dr. Jack Gould.,  verbal order given for needed amb poc labs.

## 2018-12-28 RX ORDER — CYCLOBENZAPRINE HCL 10 MG
TABLET ORAL
Qty: 60 TAB | Refills: 0 | Status: SHIPPED | OUTPATIENT
Start: 2018-12-28 | End: 2019-02-27 | Stop reason: SDUPTHER

## 2018-12-31 RX ORDER — GABAPENTIN 100 MG/1
CAPSULE ORAL
Qty: 90 CAP | Refills: 0 | Status: SHIPPED | OUTPATIENT
Start: 2018-12-31 | End: 2019-03-26 | Stop reason: SDUPTHER

## 2019-01-16 ENCOUNTER — OFFICE VISIT (OUTPATIENT)
Dept: INTERNAL MEDICINE CLINIC | Age: 39
End: 2019-01-16

## 2019-01-16 VITALS
WEIGHT: 244 LBS | HEIGHT: 62 IN | BODY MASS INDEX: 44.9 KG/M2 | HEART RATE: 76 BPM | DIASTOLIC BLOOD PRESSURE: 86 MMHG | RESPIRATION RATE: 16 BRPM | TEMPERATURE: 98 F | OXYGEN SATURATION: 97 % | SYSTOLIC BLOOD PRESSURE: 110 MMHG

## 2019-01-16 DIAGNOSIS — E78.00 HYPERCHOLESTEREMIA: ICD-10-CM

## 2019-01-16 DIAGNOSIS — L84 PRE-ULCERATIVE CORN OR CALLOUS: Primary | ICD-10-CM

## 2019-01-16 DIAGNOSIS — I10 ESSENTIAL HYPERTENSION: ICD-10-CM

## 2019-01-16 NOTE — PROGRESS NOTES
aKmala Decker is a 45 y.o. female and presents with Toe Pain (left/little toe)  . Subjective:  She has had recurrent pains involving the lt.5th toe    Hypertension Review:  The patient has essential hypertension  Diet and Lifestyle: generally follows a  low sodium diet, exercises sporadically  Home BP Monitoring: is not measured at home. Pertinent ROS: taking medications as instructed, no medication side effects noted, no TIA's, no chest pain on exertion, no dyspnea on exertion, no swelling of ankles. Dyslipidemia Review:  Patient presents for evaluation of lipids. Compliance with treatment thus far has been excellent. A repeat fasting lipid profile was done. The patient does not use medications that may worsen dyslipidemias (corticosteroids, progestins, anabolic steroids, diuretics, beta-blockers, amiodarone, cyclosporine, olanzapine). The patient exercises some            Review of Systems  Constitutional: negative for fevers, chills, anorexia and weight loss  Eyes:   negative for visual disturbance and irritation  ENT:   negative for tinnitus,sore throat,nasal congestion,ear pains. hoarseness  Respiratory:  negative for cough, hemoptysis, dyspnea,wheezing  CV:   negative for chest pain, palpitations, lower extremity edema  GI:   negative for nausea, vomiting, diarrhea, abdominal pain,melena  Endo:               negative for polyuria,polydipsia,polyphagia,heat intolerance  Genitourinary: negative for frequency, dysuria and hematuria  Integument:  negative for rash and pruritus  Hematologic:  negative for easy bruising and gum/nose bleeding  Musculoskel: negative for myalgias, arthralgias, back pain, muscle weakness, joint pain  Neurological:  negative for headaches, dizziness, vertigo, memory problems and gait   Behavl/Psych: negative for feelings of anxiety, depression, mood changes    No past medical history on file.   Past Surgical History:   Procedure Laterality Date    HX HEENT  08/2016    cornea transplant    HX HEENT  2015    cornea transplant     Social History     Socioeconomic History    Marital status:      Spouse name: Not on file    Number of children: Not on file    Years of education: Not on file    Highest education level: Not on file   Tobacco Use    Smoking status: Former Smoker    Smokeless tobacco: Never Used   Substance and Sexual Activity    Alcohol use: No    Drug use: No    Sexual activity: Yes     Partners: Male     Family History   Problem Relation Age of Onset    Hypertension Mother     Hypertension Father     Diabetes Father      Current Outpatient Medications   Medication Sig Dispense Refill    gabapentin (NEURONTIN) 100 mg capsule TAKE 1 CAPSULE BY MOUTH EVERY NIGHT 2 HOURS BEFORE BEDTIME 90 Cap 0    cyclobenzaprine (FLEXERIL) 10 mg tablet TAKE 1 TABLET BY MOUTH TWICE DAILY 60 Tab 0    amLODIPine (NORVASC) 5 mg tablet Take 1 Tab by mouth daily. 90 Tab 3    ibuprofen (MOTRIN) 800 mg tablet TAKE 1 TABLET BY MOUTH TWICE DAILY AFTER MEALS 180 Tab 3    losartan (COZAAR) 100 mg tablet TAKE 1 TABLET BY MOUTH EVERY DAY 90 Tab 3    gabapentin (NEURONTIN) 100 mg capsule TAKE 1 CAPSULE BY MOUTH EVERY NIGHT 2 HOURS BEFORE BEDTIME 90 Cap 0    atorvastatin (LIPITOR) 40 mg tablet TAKE 1 TABLET BY MOUTH DAILY 90 Tab 12    fluticasone (FLONASE) 50 mcg/actuation nasal spray INSTILL 2 SPRAYS IN EACH NOSTRIL EVERY DAY 1 Bottle 12    loratadine (CLARITIN) 10 mg tablet Take 1 Tab by mouth daily.  30 Tab 12    clindamycin (CLEOCIN T) 1 % lotion        Not on File    Objective:  Visit Vitals  /86   Pulse 76   Temp 98 °F (36.7 °C) (Oral)   Resp 16   Ht 5' 2\" (1.575 m)   Wt 244 lb (110.7 kg)   SpO2 97%   BMI 44.63 kg/m²     Physical Exam:   General appearance - alert, well appearing, and in no distress  Mental status - alert, oriented to person, place, and time  EYE-EVELIA, EOMI, corneas normal, no foreign bodies  ENT-ENT exam normal, no neck nodes or sinus tenderness  Nose - normal and patent, no erythema, discharge or polyps  Mouth - mucous membranes moist, pharynx normal without lesions  Neck - supple, no significant adenopathy   Chest - clear to auscultation, no wheezes, rales or rhonchi, symmetric air entry   Heart - normal rate, regular rhythm, normal S1, S2, no murmurs, rubs, clicks or gallops   Abdomen - soft, nontender, nondistended, no masses or organomegaly  Lymph- no adenopathy palpable  Ext-peripheral pulses normal, no pedal edema, no clubbing or cyanosis  Skin-Warm and dry. no hyperpigmentation, vitiligo, or suspicious lesions  Neuro -alert, oriented, normal speech, no focal findings or movement disorder noted  Neck-normal C-spine, no tenderness, full ROM without pain  Feet-no nail deformities or callus formation with good pulses noted  Lt. 5th toe callous    Results for orders placed or performed in visit on 11/12/18   AMB POC LIPID PROFILE   Result Value Ref Range    Cholesterol (POC) 143     Triglycerides (POC) 75     HDL Cholesterol (POC) 56     Non-HDL Cholesterol 87     LDL Cholesterol (POC) 72 MG/DL    TChol/HDL Ratio (POC) 2.6        Assessment/Plan:    ICD-10-CM ICD-9-CM    1. Pre-ulcerative corn or callous L84 700 REFERRAL TO PODIATRY   2. Hypercholesteremia E78.00 272.0    3. Essential hypertension I10 401.9      Orders Placed This Encounter    REFERRAL TO PODIATRY     Referral Priority:   Routine     Referral Type:   Consultation     Referral Reason:   Specialty Services Required     Referral Location:   [facility]     Referred to Provider:   Jenifer Cancino DPM     Number of Visits Requested:   1     lose weight, increase physical activity, follow low fat diet, follow low salt diet,Take 81mg aspirin daily  There are no Patient Instructions on file for this visit. Follow-up Disposition: Not on File      I have reviewed with the patient details of the assessment and plan and all questions were answered.  Relevent patient education was performed. The most recent lab findings were reviewed with the patient. An After Visit Summary was printed and given to the patient.

## 2019-01-16 NOTE — PATIENT INSTRUCTIONS
Proximiant Activation    Thank you for requesting access to Proximiant. Please follow the instructions below to securely access and download your online medical record. Proximiant allows you to send messages to your doctor, view your test results, renew your prescriptions, schedule appointments, and more. How Do I Sign Up? 1. In your internet browser, go to [email or web address]  2. Click on the First Time User? Click Here link in the Sign In box. You will be redirect to the New Member Sign Up page. 3. Enter your Proximiant Access Code exactly as it appears below. You will not need to use this code after youve completed the sign-up process. If you do not sign up before the expiration date, you must request a new code. Proximiant Access Code: [email or web address]  Expires: 3/2/2019  3:03 PM (This is the date your Proximiant access code will )    4. Enter the last four digits of your Social Security Number (xxxx) and Date of Birth (mm/dd/yyyy) as indicated and click Submit. You will be taken to the next sign-up page. 5. Create a Proximiant ID. This will be your Proximiant login ID and cannot be changed, so think of one that is secure and easy to remember. 6. Create a Proximiant password. You can change your password at any time. 7. Enter your Password Reset Question and Answer. This can be used at a later time if you forget your password. 8. Enter your e-mail address. You will receive e-mail notification when new information is available in [address]. 9. Click Sign Up. You can now view and download portions of your medical record. 10. Click the Download Summary menu link to download a portable copy of your medical information. Additional Information    If you have questions, please visit the Frequently Asked Questions section of the Proximiant website at [email or web address] Remember, Proximiant is NOT to be used for urgent needs. For medical emergencies, dial 911.

## 2019-01-16 NOTE — PROGRESS NOTES
1. Have you been to the ER, urgent care clinic since your last visit? Hospitalized since your last visit?no    2. Have you seen or consulted any other health care providers outside of the [address] [place] [address] since your last visit? Include any pap smears or colon screening. No    PHQ over the last two weeks 11/12/2018   PHQ Not Done Medical Reason (indicate in comments)   Little interest or pleasure in doing things Not at all   Feeling down, depressed, irritable, or hopeless Not at all   Total Score PHQ 2 0       Per Dr. Keren Delong,  verbal order given for needed amb poc labs.

## 2019-02-28 RX ORDER — CYCLOBENZAPRINE HCL 10 MG
TABLET ORAL
Qty: 60 TAB | Refills: 0 | Status: SHIPPED | OUTPATIENT
Start: 2019-02-28 | End: 2019-05-28 | Stop reason: SDUPTHER

## 2019-03-27 RX ORDER — GABAPENTIN 100 MG/1
CAPSULE ORAL
Qty: 90 CAP | Refills: 0 | Status: SHIPPED | OUTPATIENT
Start: 2019-03-27 | End: 2020-11-03 | Stop reason: ALTCHOICE

## 2019-04-14 DIAGNOSIS — J30.89 CHRONIC NONSEASONAL ALLERGIC RHINITIS DUE TO POLLEN: ICD-10-CM

## 2019-04-15 DIAGNOSIS — J30.89 CHRONIC NONSEASONAL ALLERGIC RHINITIS DUE TO POLLEN: ICD-10-CM

## 2019-04-15 RX ORDER — FLUTICASONE PROPIONATE 50 MCG
SPRAY, SUSPENSION (ML) NASAL
Qty: 1 BOTTLE | Refills: 12 | Status: SHIPPED | OUTPATIENT
Start: 2019-04-15 | End: 2019-07-17 | Stop reason: SDUPTHER

## 2019-04-15 RX ORDER — FLUTICASONE PROPIONATE 50 MCG
SPRAY, SUSPENSION (ML) NASAL
Qty: 1 BOTTLE | Refills: 12 | Status: SHIPPED | OUTPATIENT
Start: 2019-04-15 | End: 2019-04-15 | Stop reason: SDUPTHER

## 2019-05-29 RX ORDER — CYCLOBENZAPRINE HCL 10 MG
TABLET ORAL
Qty: 60 TAB | Refills: 0 | Status: SHIPPED | OUTPATIENT
Start: 2019-05-29 | End: 2019-06-29 | Stop reason: SDUPTHER

## 2019-06-21 DIAGNOSIS — E78.00 HYPERCHOLESTEREMIA: ICD-10-CM

## 2019-06-21 RX ORDER — ATORVASTATIN CALCIUM 40 MG/1
TABLET, FILM COATED ORAL
Qty: 90 TAB | Refills: 0 | Status: SHIPPED | OUTPATIENT
Start: 2019-06-21 | End: 2019-07-02 | Stop reason: SDUPTHER

## 2019-06-24 RX ORDER — GABAPENTIN 100 MG/1
CAPSULE ORAL
Qty: 90 CAP | Refills: 0 | Status: SHIPPED | OUTPATIENT
Start: 2019-06-24 | End: 2019-07-08 | Stop reason: SDUPTHER

## 2019-07-01 RX ORDER — CYCLOBENZAPRINE HCL 10 MG
TABLET ORAL
Qty: 60 TAB | Refills: 0 | Status: SHIPPED | OUTPATIENT
Start: 2019-07-01 | End: 2020-11-03 | Stop reason: SDUPTHER

## 2019-07-02 DIAGNOSIS — E78.00 HYPERCHOLESTEREMIA: ICD-10-CM

## 2019-07-03 RX ORDER — ATORVASTATIN CALCIUM 40 MG/1
TABLET, FILM COATED ORAL
Qty: 90 TAB | Refills: 0 | Status: SHIPPED | OUTPATIENT
Start: 2019-07-03 | End: 2019-09-29 | Stop reason: SDUPTHER

## 2019-07-08 DIAGNOSIS — G25.81 RESTLESS LEG SYNDROME: Primary | ICD-10-CM

## 2019-07-08 DIAGNOSIS — E78.00 HYPERCHOLESTEREMIA: ICD-10-CM

## 2019-07-08 RX ORDER — IBUPROFEN 800 MG/1
TABLET ORAL
Qty: 180 TAB | Refills: 0 | Status: SHIPPED | OUTPATIENT
Start: 2019-07-08 | End: 2020-11-03 | Stop reason: SDUPTHER

## 2019-07-08 RX ORDER — GABAPENTIN 100 MG/1
CAPSULE ORAL
Qty: 90 CAP | Refills: 0 | Status: SHIPPED | OUTPATIENT
Start: 2019-07-08 | End: 2019-07-17 | Stop reason: SDUPTHER

## 2019-07-17 ENCOUNTER — OFFICE VISIT (OUTPATIENT)
Dept: INTERNAL MEDICINE CLINIC | Age: 39
End: 2019-07-17

## 2019-07-17 VITALS
BODY MASS INDEX: 45.42 KG/M2 | HEART RATE: 74 BPM | RESPIRATION RATE: 16 BRPM | TEMPERATURE: 98.3 F | SYSTOLIC BLOOD PRESSURE: 118 MMHG | WEIGHT: 246.8 LBS | HEIGHT: 62 IN | DIASTOLIC BLOOD PRESSURE: 84 MMHG | OXYGEN SATURATION: 96 %

## 2019-07-17 DIAGNOSIS — L84 PRE-ULCERATIVE CORN OR CALLOUS: ICD-10-CM

## 2019-07-17 DIAGNOSIS — I10 ESSENTIAL HYPERTENSION: ICD-10-CM

## 2019-07-17 DIAGNOSIS — E78.00 HYPERCHOLESTEREMIA: Primary | ICD-10-CM

## 2019-07-17 LAB
CHOLEST SERPL-MCNC: 142 MG/DL
HDLC SERPL-MCNC: 56 MG/DL
LDL CHOLESTEROL POC: NORMAL MG/DL
TCHOL/HDL RATIO (POC): 2.5
TRIGL SERPL-MCNC: <45 MG/DL
VLDLC SERPL CALC-MCNC: 85 MG/DL

## 2019-07-17 NOTE — PATIENT INSTRUCTIONS
Edictive Activation    Thank you for requesting access to Edictive. Please follow the instructions below to securely access and download your online medical record. Edictive allows you to send messages to your doctor, view your test results, renew your prescriptions, schedule appointments, and more. How Do I Sign Up? 1. In your internet browser, go to [email or web address]  2. Click on the First Time User? Click Here link in the Sign In box. You will be redirect to the New Member Sign Up page. 3. Enter your Edictive Access Code exactly as it appears below. You will not need to use this code after youve completed the sign-up process. If you do not sign up before the expiration date, you must request a new code. Edictive Access Code: [email or web address]  Expires: 2019  9:36 AM (This is the date your Edictive access code will )    4. Enter the last four digits of your Social Security Number (xxxx) and Date of Birth (mm/dd/yyyy) as indicated and click Submit. You will be taken to the next sign-up page. 5. Create a Edictive ID. This will be your Edictive login ID and cannot be changed, so think of one that is secure and easy to remember. 6. Create a Edictive password. You can change your password at any time. 7. Enter your Password Reset Question and Answer. This can be used at a later time if you forget your password. 8. Enter your e-mail address. You will receive e-mail notification when new information is available in [address] 9. Click Sign Up. You can now view and download portions of your medical record. 10. Click the Download Summary menu link to download a portable copy of your medical information. Additional Information    If you have questions, please visit the Frequently Asked Questions section of the Edictive website at [email or web address] Remember, Edictive is NOT to be used for urgent needs. For medical emergencies, dial 911.
stated

## 2019-07-17 NOTE — PROGRESS NOTES
Maylin Parmar is a 44 y.o. female and presents with Cholesterol Problem and Hypertension  . Subjective:  She no longer has had recurrent pains involving the lt.5th toe  She has removed the callous. Hypertension Review:  The patient has essential hypertension  Diet and Lifestyle: generally follows a  low sodium diet, exercises sporadically  Home BP Monitoring: is not measured at home. Pertinent ROS: taking medications as instructed, no medication side effects noted, no TIA's, no chest pain on exertion, no dyspnea on exertion, no swelling of ankles. Dyslipidemia Review:  Patient presents for evaluation of lipids. Compliance with treatment thus far has been excellent. A repeat fasting lipid profile was done. The patient does not use medications that may worsen dyslipidemias (corticosteroids, progestins, anabolic steroids, diuretics, beta-blockers, amiodarone, cyclosporine, olanzapine). The patient exercises some    Review of Systems  Constitutional: negative for fevers, chills, anorexia and weight loss  Eyes:   negative for visual disturbance and irritation  ENT:   negative for tinnitus,sore throat,nasal congestion,ear pains. hoarseness  Respiratory:  negative for cough, hemoptysis, dyspnea,wheezing  CV:   negative for chest pain, palpitations, lower extremity edema  GI:   negative for nausea, vomiting, diarrhea, abdominal pain,melena  Endo:               negative for polyuria,polydipsia,polyphagia,heat intolerance  Genitourinary: negative for frequency, dysuria and hematuria  Integument:  negative for rash and pruritus  Hematologic:  negative for easy bruising and gum/nose bleeding  Musculoskel: negative for myalgias, arthralgias, back pain, muscle weakness, joint pain  Neurological:  negative for headaches, dizziness, vertigo, memory problems and gait   Behavl/Psych: negative for feelings of anxiety, depression, mood changes    History reviewed. No pertinent past medical history.   Past Surgical History:   Procedure Laterality Date    HX HEENT  08/2016    cornea transplant    HX HEENT  2015    cornea transplant     Social History     Socioeconomic History    Marital status:      Spouse name: Not on file    Number of children: Not on file    Years of education: Not on file    Highest education level: Not on file   Tobacco Use    Smoking status: Former Smoker    Smokeless tobacco: Never Used   Substance and Sexual Activity    Alcohol use: No    Drug use: No    Sexual activity: Yes     Partners: Male     Family History   Problem Relation Age of Onset    Hypertension Mother     Hypertension Father     Diabetes Father      Current Outpatient Medications   Medication Sig Dispense Refill    ibuprofen (MOTRIN) 800 mg tablet TAKE 1 TABLET BY MOUTH TWICE DAILY AFTER MEALS 180 Tab 0    atorvastatin (LIPITOR) 40 mg tablet TAKE 1 TABLET BY MOUTH EVERY DAY 90 Tab 0    cyclobenzaprine (FLEXERIL) 10 mg tablet TAKE 1 TABLET BY MOUTH TWICE DAILY 60 Tab 0    gabapentin (NEURONTIN) 100 mg capsule TAKE 1 CAPSULE BY MOUTH EVERY NIGHT 2 HOURS BEFORE BEDTIME 90 Cap 0    amLODIPine (NORVASC) 5 mg tablet Take 1 Tab by mouth daily. 90 Tab 3    losartan (COZAAR) 100 mg tablet TAKE 1 TABLET BY MOUTH EVERY DAY 90 Tab 3    fluticasone (FLONASE) 50 mcg/actuation nasal spray INSTILL 2 SPRAYS IN EACH NOSTRIL EVERY DAY 1 Bottle 12    loratadine (CLARITIN) 10 mg tablet Take 1 Tab by mouth daily.  30 Tab 12    clindamycin (CLEOCIN T) 1 % lotion        No Known Allergies    Objective:  Visit Vitals  /84 (BP 1 Location: Right arm, BP Patient Position: Sitting)   Pulse 74   Temp 98.3 °F (36.8 °C) (Oral)   Resp 16   Ht 5' 2\" (1.575 m)   Wt 246 lb 12.8 oz (111.9 kg)   SpO2 96%   BMI 45.14 kg/m²     Physical Exam:   General appearance - alert, well appearing, and in no distress  Mental status - alert, oriented to person, place, and time  EYE-EVELIA, EOMI, corneas normal, no foreign bodies  ENT-ENT exam normal, no neck nodes or sinus tenderness  Nose - normal and patent, no erythema, discharge or polyps  Mouth - mucous membranes moist, pharynx normal without lesions  Neck - supple, no significant adenopathy   Chest - clear to auscultation, no wheezes, rales or rhonchi, symmetric air entry   Heart - normal rate, regular rhythm, normal S1, S2, no murmurs, rubs, clicks or gallops   Abdomen - soft, nontender, nondistended, no masses or organomegaly  Lymph- no adenopathy palpable  Ext-peripheral pulses normal, no pedal edema, no clubbing or cyanosis  Skin-Warm and dry. no hyperpigmentation, vitiligo, or suspicious lesions  Neuro -alert, oriented, normal speech, no focal findings or movement disorder noted  Neck-normal C-spine, no tenderness, full ROM without pain  Feet-no nail deformities or callus formation with good pulses noted  Lt. 5th toe callous    Results for orders placed or performed in visit on 07/17/19   AMB POC LIPID PROFILE   Result Value Ref Range    Cholesterol (POC) 142     Triglycerides (POC) <45     HDL Cholesterol (POC) 56     VLDL (POC) 85 MG/DL    LDL Cholesterol (POC) n/a MG/DL    TChol/HDL Ratio (POC) 2.5        Assessment/Plan:    ICD-10-CM ICD-9-CM    1. Hypercholesteremia E78.00 272.0 AMB POC LIPID PROFILE   2. Essential hypertension S29 118.0 METABOLIC PANEL, COMPREHENSIVE      CBC W/O DIFF   3. Pre-ulcerative corn or callous L84 700      Orders Placed This Encounter    METABOLIC PANEL, COMPREHENSIVE    CBC W/O DIFF    AMB POC LIPID PROFILE     lose weight, increase physical activity, follow low fat diet, follow low salt diet,Take 81mg aspirin daily  Patient Instructions   [employer] Activation    Thank you for requesting access to [facility]. Please follow the instructions below to securely access and download your online medical record. [employer] allows you to send messages to your doctor, view your test results, renew your prescriptions, schedule appointments, and more. How Do I Sign Up? 1.  In your internet browser, go to [email or web address]  2. Click on the First Time User? Click Here link in the Sign In box. You will be redirect to the New Member Sign Up page. 3. Enter your [facility] Access Code exactly as it appears below. You will not need to use this code after youve completed the sign-up process. If you do not sign up before the expiration date, you must request a new code. [facility] Access Code: [email or web address]  Expires: 2019  9:36 AM (This is the date your [facility] access code will )    4. Enter the last four digits of your Social Security Number (xxxx) and Date of Birth (mm/dd/yyyy) as indicated and click Submit. You will be taken to the next sign-up page. 5. Create a [facility] ID. This will be your [facility] login ID and cannot be changed, so think of one that is secure and easy to remember. 6. Create a [facility] password. You can change your password at any time. 7. Enter your Password Reset Question and Answer. This can be used at a later time if you forget your password. 8. Enter your e-mail address. You will receive e-mail notification when new information is available in [address] 9. Click Sign Up. You can now view and download portions of your medical record. 10. Click the Download Summary menu link to download a portable copy of your medical information. Additional Information    If you have questions, please visit the Frequently Asked Questions section of the [employer] website at [email or web address] Remember, [facility] is NOT to be used for urgent needs. For medical emergencies, dial 911. Follow-up and Dispositions    · Return in about 3 months (around 10/17/2019), or if symptoms worsen or fail to improve. I have reviewed with the patient details of the assessment and plan and all questions were answered. Relevent patient education was performed. The most recent lab findings were reviewed with the patient.     An After Visit Summary was printed and given to the patient.

## 2019-07-17 NOTE — PROGRESS NOTES
Chief Complaint   Patient presents with    Cholesterol Problem    Hypertension     1. Have you been to the ER, urgent care clinic since your last visit? Hospitalized since your last visit? No    2. Have you seen or consulted any other health care providers outside of the [address] [place] [address] since your last visit? Include any pap smears or colon screening.  No

## 2019-09-29 DIAGNOSIS — E78.00 HYPERCHOLESTEREMIA: ICD-10-CM

## 2019-10-02 RX ORDER — ATORVASTATIN CALCIUM 40 MG/1
TABLET, FILM COATED ORAL
Qty: 90 TAB | Refills: 0 | Status: SHIPPED | OUTPATIENT
Start: 2019-10-02 | End: 2020-11-03

## 2019-10-06 RX ORDER — AMLODIPINE BESYLATE 5 MG/1
TABLET ORAL
Qty: 90 TAB | Refills: 0 | Status: SHIPPED | OUTPATIENT
Start: 2019-10-06 | End: 2020-11-03 | Stop reason: ALTCHOICE

## 2020-01-04 DIAGNOSIS — I10 ESSENTIAL HYPERTENSION: ICD-10-CM

## 2020-01-07 RX ORDER — LOSARTAN POTASSIUM 100 MG/1
TABLET ORAL
Qty: 90 TAB | Refills: 3 | Status: SHIPPED | OUTPATIENT
Start: 2020-01-07 | End: 2020-11-03 | Stop reason: ALTCHOICE

## 2020-11-03 ENCOUNTER — OFFICE VISIT (OUTPATIENT)
Dept: INTERNAL MEDICINE CLINIC | Age: 40
End: 2020-11-03
Payer: COMMERCIAL

## 2020-11-03 VITALS
BODY MASS INDEX: 49.32 KG/M2 | TEMPERATURE: 98.5 F | WEIGHT: 268 LBS | HEART RATE: 82 BPM | DIASTOLIC BLOOD PRESSURE: 70 MMHG | HEIGHT: 62 IN | OXYGEN SATURATION: 98 % | SYSTOLIC BLOOD PRESSURE: 120 MMHG

## 2020-11-03 DIAGNOSIS — E78.00 HYPERCHOLESTEREMIA: Primary | ICD-10-CM

## 2020-11-03 DIAGNOSIS — J30.89 SEASONAL ALLERGIC RHINITIS DUE TO FUNGAL SPORES: ICD-10-CM

## 2020-11-03 DIAGNOSIS — E55.9 VITAMIN D DEFICIENCY: ICD-10-CM

## 2020-11-03 DIAGNOSIS — I10 LABILE ESSENTIAL HYPERTENSION: ICD-10-CM

## 2020-11-03 LAB
25(OH)D3 SERPL-MCNC: 27.5 NG/ML (ref 30–100)
ALBUMIN SERPL-MCNC: 3.5 G/DL (ref 3.5–5)
ALBUMIN/GLOB SERPL: 1 {RATIO} (ref 1.1–2.2)
ALP SERPL-CCNC: 62 U/L (ref 45–117)
ALT SERPL-CCNC: 16 U/L (ref 12–78)
ANION GAP SERPL CALC-SCNC: 6 MMOL/L (ref 5–15)
AST SERPL-CCNC: 9 U/L (ref 15–37)
BILIRUB SERPL-MCNC: 0.3 MG/DL (ref 0.2–1)
BUN SERPL-MCNC: 13 MG/DL (ref 6–20)
BUN/CREAT SERPL: 15 (ref 12–20)
CALCIUM SERPL-MCNC: 8.6 MG/DL (ref 8.5–10.1)
CHLORIDE SERPL-SCNC: 104 MMOL/L (ref 97–108)
CHOLEST SERPL-MCNC: 215 MG/DL
CO2 SERPL-SCNC: 26 MMOL/L (ref 21–32)
CREAT SERPL-MCNC: 0.89 MG/DL (ref 0.55–1.02)
ERYTHROCYTE [DISTWIDTH] IN BLOOD BY AUTOMATED COUNT: 12.1 % (ref 11.5–14.5)
GLOBULIN SER CALC-MCNC: 3.4 G/DL (ref 2–4)
GLUCOSE SERPL-MCNC: 111 MG/DL (ref 65–100)
HCT VFR BLD AUTO: 40.4 % (ref 35–47)
HDLC SERPL-MCNC: 57 MG/DL
HGB BLD-MCNC: 13.1 G/DL (ref 11.5–16)
LDL CHOLESTEROL POC: 130 MG/DL
MCH RBC QN AUTO: 30.1 PG (ref 26–34)
MCHC RBC AUTO-ENTMCNC: 32.4 G/DL (ref 30–36.5)
MCV RBC AUTO: 92.9 FL (ref 80–99)
NON-HDL GOAL (POC): 157
NRBC # BLD: 0 K/UL (ref 0–0.01)
NRBC BLD-RTO: 0 PER 100 WBC
PLATELET # BLD AUTO: 304 K/UL (ref 150–400)
PMV BLD AUTO: 9.6 FL (ref 8.9–12.9)
POTASSIUM SERPL-SCNC: 4 MMOL/L (ref 3.5–5.1)
PROT SERPL-MCNC: 6.9 G/DL (ref 6.4–8.2)
RBC # BLD AUTO: 4.35 M/UL (ref 3.8–5.2)
SODIUM SERPL-SCNC: 136 MMOL/L (ref 136–145)
TCHOL/HDL RATIO (POC): 3.7
TRIGL SERPL-MCNC: 135 MG/DL
WBC # BLD AUTO: 4 K/UL (ref 3.6–11)

## 2020-11-03 PROCEDURE — 99214 OFFICE O/P EST MOD 30 MIN: CPT | Performed by: INTERNAL MEDICINE

## 2020-11-03 PROCEDURE — 80061 LIPID PANEL: CPT | Performed by: INTERNAL MEDICINE

## 2020-11-03 RX ORDER — CYCLOBENZAPRINE HCL 10 MG
TABLET ORAL
Qty: 60 TAB | Refills: 3 | Status: SHIPPED | OUTPATIENT
Start: 2020-11-03 | End: 2021-03-02

## 2020-11-03 RX ORDER — ATORVASTATIN CALCIUM 80 MG/1
TABLET, FILM COATED ORAL
Qty: 90 TAB | Refills: 3 | Status: SHIPPED | OUTPATIENT
Start: 2020-11-03 | End: 2021-08-24

## 2020-11-03 RX ORDER — IBUPROFEN 800 MG/1
TABLET ORAL
Qty: 60 TAB | Refills: 3 | Status: SHIPPED | OUTPATIENT
Start: 2020-11-03 | End: 2021-03-01

## 2020-11-03 NOTE — PATIENT INSTRUCTIONS
[employer] Activation Thank you for requesting access to [employer]. Please follow the instructions below to securely access and download your online medical record. [facility] allows you to send messages to your doctor, view your test results, renew your prescriptions, schedule appointments, and more. How Do I Sign Up? 1. In your internet browser, go to [email or web address] 
2. Click on the First Time User? Click Here link in the Sign In box. You will be redirect to the New Member Sign Up page. 3. Enter your [employer] Access Code exactly as it appears below. You will not need to use this code after youve completed the sign-up process. If you do not sign up before the expiration date, you must request a new code. [employer] Access Code: [email or web address] Expires: 2020 10:22 AM (This is the date your [employer] access code will ) 4. Enter the last four digits of your Social Security Number (xxxx) and Date of Birth (mm/dd/yyyy) as indicated and click Submit. You will be taken to the next sign-up page. 5. Create a [employer] ID. This will be your [facility] login ID and cannot be changed, so think of one that is secure and easy to remember. 6. Create a [employer] password. You can change your password at any time. 7. Enter your Password Reset Question and Answer. This can be used at a later time if you forget your password. 8. Enter your e-mail address. You will receive e-mail notification when new information is available in [address] 9. Click Sign Up. You can now view and download portions of your medical record. 10. Click the Download Summary menu link to download a portable copy of your medical information. Additional Information If you have questions, please visit the Frequently Asked Questions section of the [employer] website at [email or web address] Remember, [employer] is NOT to be used for urgent needs. For medical emergencies, dial 911.

## 2020-11-03 NOTE — PROGRESS NOTES
Trae Couch is a 36 y.o. female and presents with Hypertension and Annual Wellness Visit  . Subjective:    She needs an annual exam    Hypertension Review:  The patient has labile hypertension. Diet and Lifestyle: generally follows a  low sodium diet, exercises sporadically  Home BP Monitoring: is not measured at home. Pertinent ROS: taking medications as instructed, no medication side effects noted, no TIA's, no chest pain on exertion, no dyspnea on exertion, no swelling of ankles. Dyslipidemia Review:  Patient presents for evaluation of lipids. Compliance with treatment thus far has been excellent. A repeat fasting lipid profile was not done. The patient does not use medications that may worsen dyslipidemias (corticosteroids, progestins, anabolic steroids, amiodarone, cyclosporine, olanzapine). The patient exercises some      Allergic Rhinitis  Patient presents for evaluation of allergic symptoms. denies nasal congestion, rhinorrhea, sneezing, eye itching, watery eyes. Precipitants haved included possible pollen. She has  Had recurrent foot and calf pains with relief with Ibuprofen reported. Review of Systems  Constitutional: negative for fevers, chills, anorexia and weight loss  Eyes:   negative for visual disturbance and irritation  ENT:   negative for tinnitus,sore throat,nasal congestion,ear pains. hoarseness  Respiratory:  negative for cough, hemoptysis, dyspnea,wheezing  CV:   negative for chest pain, palpitations, lower extremity edema  GI:   negative for nausea, vomiting, diarrhea, abdominal pain,melena  Endo:               negative for polyuria,polydipsia,polyphagia,heat intolerance  Genitourinary: negative for frequency, dysuria and hematuria  Integument:  negative for rash and pruritus  Hematologic:  negative for easy bruising and gum/nose bleeding  Musculoskel:  myalgias, arthralgias,  Neurological:  negative for headaches, dizziness, vertigo, memory problems and gait Behavl/Psych: negative for feelings of anxiety, depression, mood changes    History reviewed. No pertinent past medical history. Past Surgical History:   Procedure Laterality Date    HX HEENT  08/2016    cornea transplant    HX HEENT  2015    cornea transplant     Social History     Socioeconomic History    Marital status:      Spouse name: Not on file    Number of children: Not on file    Years of education: Not on file    Highest education level: Not on file   Tobacco Use    Smoking status: Former Smoker    Smokeless tobacco: Never Used   Substance and Sexual Activity    Alcohol use: No    Drug use: No    Sexual activity: Yes     Partners: Male     Family History   Problem Relation Age of Onset    Hypertension Mother     Hypertension Father     Diabetes Father      Current Outpatient Medications   Medication Sig Dispense Refill    atorvastatin (LIPITOR) 40 mg tablet TAKE 1 TABLET BY MOUTH EVERY DAY 90 Tab 0    ibuprofen (MOTRIN) 800 mg tablet TAKE 1 TABLET BY MOUTH TWICE DAILY AFTER MEALS 180 Tab 0    cyclobenzaprine (FLEXERIL) 10 mg tablet TAKE 1 TABLET BY MOUTH TWICE DAILY 60 Tab 0    fluticasone (FLONASE) 50 mcg/actuation nasal spray INSTILL 2 SPRAYS IN EACH NOSTRIL EVERY DAY 1 Bottle 12    loratadine (CLARITIN) 10 mg tablet Take 1 Tab by mouth daily.  30 Tab 12     Allergies   Allergen Reactions    Banana Hives    Coconut Hives     Oil and food       Objective:  Visit Vitals  /70 (BP 1 Location: Left arm, BP Patient Position: Sitting)   Pulse 82   Temp 98.5 °F (36.9 °C) (Oral)   Ht 5' 2\" (1.575 m)   Wt 268 lb (121.6 kg)   LMP 10/11/2020   SpO2 98%   BMI 49.02 kg/m²     Physical Exam:   General appearance - alert, well appearing, and in no distress  Mental status - alert, oriented to person, place, and time  EYE-EVELIA, EOMI, corneas normal, no foreign bodies  ENT-ENT exam normal, no neck nodes or sinus tenderness  Nose - normal and patent, no erythema, discharge or polyps  Mouth - mucous membranes moist, pharynx normal without lesions  Neck - supple, no significant adenopathy   Chest - clear to auscultation, no wheezes, rales or rhonchi, symmetric air entry   Heart - normal rate, regular rhythm, normal S1, S2, no murmurs, rubs, clicks or gallops   Abdomen - soft, nontender, nondistended, no masses or organomegaly  Lymph- no adenopathy palpable  Ext-peripheral pulses normal, no pedal edema, no clubbing or cyanosis  Skin-Warm and dry. no hyperpigmentation, vitiligo, or suspicious lesions  Neuro -alert, oriented, normal speech, no focal findings or movement disorder noted  Neck-normal C-spine, no tenderness, full ROM without pain  Feet-no nail deformities or callus formation with good pulses noted      Results for orders placed or performed in visit on 07/17/19   AMB POC LIPID PROFILE   Result Value Ref Range    Cholesterol (POC) 142     Triglycerides (POC) <45     HDL Cholesterol (POC) 56     VLDL (POC) 85 MG/DL    LDL Cholesterol (POC) n/a MG/DL    TChol/HDL Ratio (POC) 2.5        Assessment/Plan:    ICD-10-CM ICD-9-CM    1. Hypercholesteremia  E78.00 272.0 AMB POC LIPID PROFILE   2. Labile essential hypertension  I10 401.9 CBC W/O DIFF      METABOLIC PANEL, COMPREHENSIVE   3. Seasonal allergic rhinitis due to fungal spores  J30.89 477.8    4. Vitamin D deficiency  E55.9 268.9 VITAMIN D, 25 HYDROXY     Orders Placed This Encounter    CBC W/O DIFF     Standing Status:   Future     Standing Expiration Date:   51/1/2365    METABOLIC PANEL, COMPREHENSIVE     Standing Status:   Future     Standing Expiration Date:   11/3/2021    VITAMIN D, 25 HYDROXY     Standing Status:   Future     Standing Expiration Date:   11/3/2021    AMB POC LIPID PROFILE     lose weight, increase physical activity, follow low fat diet, follow low salt diet, continue present plan,Take 81mg aspirin daily  There are no Patient Instructions on file for this visit.          I have reviewed with the patient details of the assessment and plan and all questions were answered. Relevent patient education was performed. The most recent lab findings were reviewed with the patient. An After Visit Summary was printed and given to the patient.

## 2020-11-03 NOTE — PROGRESS NOTES
Chief Complaint   Patient presents with    Hypertension    Annual Wellness Visit     3 most recent PHQ Screens 11/3/2020   PHQ Not Done -   Little interest or pleasure in doing things Not at all   Feeling down, depressed, irritable, or hopeless Not at all   Total Score PHQ 2 0     1. Have you been to the ER, urgent care clinic since your last visit? Hospitalized since your last visit?no    2. Have you seen or consulted any other health care providers outside of the [address] [place] [address] since your last visit? Include any pap smears or colon screening.  no

## 2020-11-04 RX ORDER — ERGOCALCIFEROL 1.25 MG/1
50000 CAPSULE ORAL
Qty: 4 CAP | Refills: 12 | Status: SHIPPED | OUTPATIENT
Start: 2020-11-04 | End: 2022-01-18 | Stop reason: SDUPTHER

## 2021-02-28 DIAGNOSIS — E78.00 HYPERCHOLESTEREMIA: ICD-10-CM

## 2021-03-01 RX ORDER — IBUPROFEN 800 MG/1
TABLET ORAL
Qty: 60 TAB | Refills: 3 | Status: SHIPPED | OUTPATIENT
Start: 2021-03-01 | End: 2021-06-25

## 2021-03-02 RX ORDER — CYCLOBENZAPRINE HCL 10 MG
TABLET ORAL
Qty: 60 TAB | Refills: 3 | Status: SHIPPED | OUTPATIENT
Start: 2021-03-02 | End: 2021-06-25

## 2021-06-25 DIAGNOSIS — E78.00 HYPERCHOLESTEREMIA: ICD-10-CM

## 2021-06-25 RX ORDER — IBUPROFEN 800 MG/1
TABLET ORAL
Qty: 60 TABLET | Refills: 3 | Status: SHIPPED | OUTPATIENT
Start: 2021-06-25 | End: 2021-10-23

## 2021-06-25 RX ORDER — CYCLOBENZAPRINE HCL 10 MG
TABLET ORAL
Qty: 60 TABLET | Refills: 3 | Status: SHIPPED | OUTPATIENT
Start: 2021-06-25 | End: 2021-10-31

## 2021-08-16 ENCOUNTER — OFFICE VISIT (OUTPATIENT)
Dept: INTERNAL MEDICINE CLINIC | Age: 41
End: 2021-08-16
Payer: COMMERCIAL

## 2021-08-16 VITALS
DIASTOLIC BLOOD PRESSURE: 84 MMHG | SYSTOLIC BLOOD PRESSURE: 122 MMHG | WEIGHT: 271 LBS | HEIGHT: 62 IN | RESPIRATION RATE: 16 BRPM | BODY MASS INDEX: 49.87 KG/M2 | TEMPERATURE: 98.2 F | HEART RATE: 79 BPM | OXYGEN SATURATION: 96 %

## 2021-08-16 DIAGNOSIS — J30.89 SEASONAL ALLERGIC RHINITIS DUE TO FUNGAL SPORES: ICD-10-CM

## 2021-08-16 DIAGNOSIS — E78.00 HYPERCHOLESTEREMIA: Primary | ICD-10-CM

## 2021-08-16 DIAGNOSIS — I10 LABILE ESSENTIAL HYPERTENSION: ICD-10-CM

## 2021-08-16 DIAGNOSIS — E55.9 VITAMIN D DEFICIENCY: ICD-10-CM

## 2021-08-16 LAB
CHOLEST SERPL-MCNC: 136 MG/DL
HDLC SERPL-MCNC: 47 MG/DL
LDL CHOLESTEROL POC: 75 MG/DL
NON-HDL CHOLESTEROL, 011976: 90
TCHOL/HDL RATIO (POC): 2.9
TRIGL SERPL-MCNC: 72 MG/DL

## 2021-08-16 PROCEDURE — 80061 LIPID PANEL: CPT | Performed by: INTERNAL MEDICINE

## 2021-08-16 PROCEDURE — 99214 OFFICE O/P EST MOD 30 MIN: CPT | Performed by: INTERNAL MEDICINE

## 2021-08-16 NOTE — PROGRESS NOTES
Cherre Spatz is a 39 y.o. female and presents with No chief complaint on file. .  Subjective:        Hypertension Review:  The patient has labile hypertension. Diet and Lifestyle: generally follows a  low sodium diet, exercises sporadically  Home BP Monitoring: is not measured at home. Pertinent ROS: she is not taking medications      Dyslipidemia Review:  Patient presents for evaluation of lipids. Compliance with treatment thus far has been excellent. A repeat fasting lipid profile was not done. The patient does not use medications that may worsen dyslipidemias (corticosteroids, progestins, anabolic steroids, amiodarone, cyclosporine, olanzapine). The patient exercises some      Allergic Rhinitis  Patient presents for evaluation of allergic symptoms. denies nasal congestion, rhinorrhea, sneezing, eye itching, watery eyes. Precipitants haved included possible pollen. Vitamin D Deficiency Review   The patient has a previous history of vitamin d deficiency  The patient is on medication for vitamin d deficiency  The patient has denied any recent falls or fractures        Review of Systems  Constitutional: negative for fevers, chills, anorexia and weight loss  Eyes:   negative for visual disturbance and irritation  ENT:   negative for tinnitus,sore throat,nasal congestion,ear pains. hoarseness  Respiratory:  negative for cough, hemoptysis, dyspnea,wheezing  CV:   negative for chest pain, palpitations, lower extremity edema  GI:   negative for nausea, vomiting, diarrhea, abdominal pain,melena  Endo:               negative for polyuria,polydipsia,polyphagia,heat intolerance  Genitourinary: negative for frequency, dysuria and hematuria  Integument:  negative for rash and pruritus  Hematologic:  negative for easy bruising and gum/nose bleeding  Musculoskel:  myalgias, arthralgias,  Neurological:  negative for headaches, dizziness, vertigo, memory problems and gait   Behavl/Psych: negative for feelings of anxiety, depression, mood changes    History reviewed. No pertinent past medical history. Past Surgical History:   Procedure Laterality Date    HX HEENT  08/2016    cornea transplant    HX HEENT  2015    cornea transplant     Social History     Socioeconomic History    Marital status:      Spouse name: Not on file    Number of children: Not on file    Years of education: Not on file    Highest education level: Not on file   Tobacco Use    Smoking status: Former Smoker    Smokeless tobacco: Never Used   Substance and Sexual Activity    Alcohol use: No    Drug use: No    Sexual activity: Yes     Partners: Male     Social Determinants of Health     Financial Resource Strain:     Difficulty of Paying Living Expenses:    Food Insecurity:     Worried About Running Out of Food in the Last Year:     [address] in the Last Year:    Transportation Needs:     Lack of Transportation (Medical):  Lack of Transportation (Non-Medical):    Physical Activity:     Days of Exercise per Week:     Minutes of Exercise per Session:    Stress:     Feeling of Stress :    Social Connections:     Frequency of Communication with Friends and Family:     Frequency of Social Gatherings with Friends and Family:     Attends [employer] Services:     Active Member of Clubs or Organizations:     Attends Club or Organization Meetings:     Marital Status:      Family History   Problem Relation Age of Onset    Hypertension Mother     Hypertension Father     Diabetes Father      Current Outpatient Medications   Medication Sig Dispense Refill    ibuprofen (MOTRIN) 800 mg tablet TAKE 1 TABLET BY MOUTH TWICE DAILY AFTER MEALS 60 Tablet 3    cyclobenzaprine (FLEXERIL) 10 mg tablet TAKE 1 TABLET BY MOUTH TWICE A DAY 60 Tablet 3    ergocalciferol (ERGOCALCIFEROL) 1,250 mcg (50,000 unit) capsule Take 1 Cap by mouth every seven (7) days.  4 Cap 12    atorvastatin (LIPITOR) 80 mg tablet TAKE 1 TABLET BY MOUTH EVERY DAY 90 Tab 3    fluticasone (FLONASE) 50 mcg/actuation nasal spray INSTILL 2 SPRAYS IN EACH NOSTRIL EVERY DAY 1 Bottle 12    loratadine (CLARITIN) 10 mg tablet Take 1 Tab by mouth daily. 30 Tab 12     Allergies   Allergen Reactions    Banana Hives    Coconut Hives     Oil and food       Objective:  Visit Vitals  /84   Pulse 79   Temp 98.2 °F (36.8 °C) (Oral)   Resp 16   Ht 5' 2\" (1.575 m)   Wt 271 lb (122.9 kg)   SpO2 96%   BMI 49.57 kg/m²     Physical Exam:   General appearance - alert, well appearing, and in no distress  Mental status - alert, oriented to person, place, and time  EYE-EVELIA, EOMI, corneas normal, no foreign bodies  ENT-ENT exam normal, no neck nodes or sinus tenderness  Nose - normal and patent, no erythema, discharge or polyps  Mouth - mucous membranes moist, pharynx normal without lesions  Neck - supple, no significant adenopathy   Chest - clear to auscultation, no wheezes, rales or rhonchi, symmetric air entry   Heart - normal rate, regular rhythm, normal S1, S2, no murmurs, rubs, clicks or gallops   Abdomen - soft, nontender, nondistended, no masses or organomegaly  Lymph- no adenopathy palpable  Ext-peripheral pulses normal, no pedal edema, no clubbing or cyanosis  Skin-Warm and dry.  no hyperpigmentation, vitiligo, or suspicious lesions  Neuro -alert, oriented, normal speech, no focal findings or movement disorder noted  Neck-normal C-spine, no tenderness, full ROM without pain  Feet-no nail deformities or callus formation with good pulses noted      Results for orders placed or performed in visit on 11/03/20   VITAMIN D, 25 HYDROXY   Result Value Ref Range    Vitamin D 25-Hydroxy 27.5 (L) 30 - 019 ng/mL   METABOLIC PANEL, COMPREHENSIVE   Result Value Ref Range    Sodium 136 136 - 145 mmol/L    Potassium 4.0 3.5 - 5.1 mmol/L    Chloride 104 97 - 108 mmol/L    CO2 26 21 - 32 mmol/L    Anion gap 6 5 - 15 mmol/L    Glucose 111 (H) 65 - 100 mg/dL    BUN 13 6 - 20 MG/DL    Creatinine 0.89 0.55 - 1.02 MG/DL    BUN/Creatinine ratio 15 12 - 20      GFR est AA >60 >60 ml/min/1.73m2    GFR est non-AA >60 >60 ml/min/1.73m2    Calcium 8.6 8.5 - 10.1 MG/DL    Bilirubin, total 0.3 0.2 - 1.0 MG/DL    ALT (SGPT) 16 12 - 78 U/L    AST (SGOT) 9 (L) 15 - 37 U/L    Alk. phosphatase 62 45 - 117 U/L    Protein, total 6.9 6.4 - 8.2 g/dL    Albumin 3.5 3.5 - 5.0 g/dL    Globulin 3.4 2.0 - 4.0 g/dL    A-G Ratio 1.0 (L) 1.1 - 2.2     CBC W/O DIFF   Result Value Ref Range    WBC 4.0 3.6 - 11.0 K/uL    RBC 4.35 3.80 - 5.20 M/uL    HGB 13.1 11.5 - 16.0 g/dL    HCT 40.4 35.0 - 47.0 %    MCV 92.9 80.0 - 99.0 FL    MCH 30.1 26.0 - 34.0 PG    MCHC 32.4 30.0 - 36.5 g/dL    RDW 12.1 11.5 - 14.5 %    PLATELET 881 003 - 423 K/uL    MPV 9.6 8.9 - 12.9 FL    NRBC 0.0 0  WBC    ABSOLUTE NRBC 0.00 0.00 - 0.01 K/uL   AMB POC LIPID PROFILE   Result Value Ref Range    Cholesterol (POC) 215     Triglycerides (POC) 135     HDL Cholesterol (POC) 57     LDL Cholesterol (POC) 130 MG/DL    Non-HDL Goal (POC) 157     TChol/HDL Ratio (POC) 3.7        Assessment/Plan:    ICD-10-CM ICD-9-CM    1. Hypercholesteremia  E78.00 272.0 AMB POC LIPID PROFILE   2. Labile essential hypertension  I10 401.9 AMB POC LIPID PROFILE      METABOLIC PANEL, BASIC      CBC W/O DIFF   3.  Vitamin D deficiency  E55.9 268.9 VITAMIN D, 25 HYDROXY   4. Seasonal allergic rhinitis due to fungal spores  J30.89 477.8      Orders Placed This Encounter    METABOLIC PANEL, BASIC     Standing Status:   Future     Standing Expiration Date:   8/16/2022    CBC W/O DIFF     Standing Status:   Future     Standing Expiration Date:   8/16/2022    VITAMIN D, 25 HYDROXY     Standing Status:   Future     Standing Expiration Date:   8/16/2022    AMB POC LIPID PROFILE     lose weight, increase physical activity, follow low fat diet, follow low salt diet, continue present plan,Take 81mg aspirin daily  Patient Instructions   MyChart Activation    Thank you for requesting access to [facility]. Please follow the instructions below to securely access and download your online medical record. Kewen allows you to send messages to your doctor, view your test results, renew your prescriptions, schedule appointments, and more. How Do I Sign Up? 1. In your internet browser, go to [email or web address]  2. Click on the First Time User? Click Here link in the Sign In box. You will be redirect to the New Member Sign Up page. 3. Enter your Kewen Access Code exactly as it appears below. You will not need to use this code after youve completed the sign-up process. If you do not sign up before the expiration date, you must request a new code. Kewen Access Code: [email or web address]  Expires: 2021  8:13 AM (This is the date your Kewen access code will )    4. Enter the last four digits of your Social Security Number (xxxx) and Date of Birth (mm/dd/yyyy) as indicated and click Submit. You will be taken to the next sign-up page. 5. Create a Kewen ID. This will be your Kewen login ID and cannot be changed, so think of one that is secure and easy to remember. 6. Create a Kewen password. You can change your password at any time. 7. Enter your Password Reset Question and Answer. This can be used at a later time if you forget your password. 8. Enter your e-mail address. You will receive e-mail notification when new information is available in [address] 9. Click Sign Up. You can now view and download portions of your medical record. 10. Click the Download Summary menu link to download a portable copy of your medical information. Additional Information    If you have questions, please visit the Frequently Asked Questions section of the Kewen website at [email or web address] Remember, Kewen is NOT to be used for urgent needs. For medical emergencies, dial 911.            Follow-up and Dispositions    · Return in about 3 months (around 2021), or if symptoms worsen or fail to improve. I have reviewed with the patient details of the assessment and plan and all questions were answered. Relevent patient education was performed. The most recent lab findings were reviewed with the patient. An After Visit Summary was printed and given to the patient.

## 2021-08-16 NOTE — PATIENT INSTRUCTIONS
ZMP Activation    Thank you for requesting access to ZMP. Please follow the instructions below to securely access and download your online medical record. ZMP allows you to send messages to your doctor, view your test results, renew your prescriptions, schedule appointments, and more. How Do I Sign Up? 1. In your internet browser, go to [email or web address]  2. Click on the First Time User? Click Here link in the Sign In box. You will be redirect to the New Member Sign Up page. 3. Enter your ZMP Access Code exactly as it appears below. You will not need to use this code after youve completed the sign-up process. If you do not sign up before the expiration date, you must request a new code. ZMP Access Code: [email or web address]  Expires: 2021  8:13 AM (This is the date your ZMP access code will )    4. Enter the last four digits of your Social Security Number (xxxx) and Date of Birth (mm/dd/yyyy) as indicated and click Submit. You will be taken to the next sign-up page. 5. Create a ZMP ID. This will be your ZMP login ID and cannot be changed, so think of one that is secure and easy to remember. 6. Create a ZMP password. You can change your password at any time. 7. Enter your Password Reset Question and Answer. This can be used at a later time if you forget your password. 8. Enter your e-mail address. You will receive e-mail notification when new information is available in [address] 9. Click Sign Up. You can now view and download portions of your medical record. 10. Click the Download Summary menu link to download a portable copy of your medical information. Additional Information    If you have questions, please visit the Frequently Asked Questions section of the ZMP website at [email or web address] Remember, ZMP is NOT to be used for urgent needs. For medical emergencies, dial 911.

## 2021-08-23 DIAGNOSIS — E78.00 HYPERCHOLESTEREMIA: ICD-10-CM

## 2021-08-24 RX ORDER — ATORVASTATIN CALCIUM 80 MG/1
TABLET, FILM COATED ORAL
Qty: 90 TABLET | Refills: 3 | Status: SHIPPED | OUTPATIENT
Start: 2021-08-24

## 2021-10-23 DIAGNOSIS — E78.00 HYPERCHOLESTEREMIA: ICD-10-CM

## 2021-10-23 RX ORDER — IBUPROFEN 800 MG/1
TABLET ORAL
Qty: 60 TABLET | Refills: 3 | Status: SHIPPED | OUTPATIENT
Start: 2021-10-23 | End: 2022-02-24

## 2021-10-31 RX ORDER — CYCLOBENZAPRINE HCL 10 MG
TABLET ORAL
Qty: 60 TABLET | Refills: 3 | Status: SHIPPED | OUTPATIENT
Start: 2021-10-31

## 2022-01-18 ENCOUNTER — OFFICE VISIT (OUTPATIENT)
Dept: INTERNAL MEDICINE CLINIC | Age: 42
End: 2022-01-18
Payer: COMMERCIAL

## 2022-01-18 VITALS
BODY MASS INDEX: 47.29 KG/M2 | DIASTOLIC BLOOD PRESSURE: 88 MMHG | WEIGHT: 257 LBS | RESPIRATION RATE: 16 BRPM | HEIGHT: 62 IN | HEART RATE: 90 BPM | TEMPERATURE: 98 F | OXYGEN SATURATION: 98 % | SYSTOLIC BLOOD PRESSURE: 126 MMHG

## 2022-01-18 DIAGNOSIS — E55.9 VITAMIN D DEFICIENCY: ICD-10-CM

## 2022-01-18 DIAGNOSIS — J30.89 SEASONAL ALLERGIC RHINITIS DUE TO FUNGAL SPORES: ICD-10-CM

## 2022-01-18 DIAGNOSIS — E78.00 HYPERCHOLESTEREMIA: Primary | ICD-10-CM

## 2022-01-18 LAB
25(OH)D3 SERPL-MCNC: 29.4 NG/ML (ref 30–100)
ALBUMIN SERPL-MCNC: 3.5 G/DL (ref 3.5–5)
ALBUMIN/GLOB SERPL: 1.1 {RATIO} (ref 1.1–2.2)
ALP SERPL-CCNC: 92 U/L (ref 45–117)
ALT SERPL-CCNC: 19 U/L (ref 12–78)
ANION GAP SERPL CALC-SCNC: 4 MMOL/L (ref 5–15)
AST SERPL-CCNC: 8 U/L (ref 15–37)
BILIRUB SERPL-MCNC: 0.4 MG/DL (ref 0.2–1)
BUN SERPL-MCNC: 8 MG/DL (ref 6–20)
BUN/CREAT SERPL: 9 (ref 12–20)
CALCIUM SERPL-MCNC: 8.7 MG/DL (ref 8.5–10.1)
CHLORIDE SERPL-SCNC: 109 MMOL/L (ref 97–108)
CHOLEST SERPL-MCNC: 121 MG/DL
CO2 SERPL-SCNC: 27 MMOL/L (ref 21–32)
CREAT SERPL-MCNC: 0.91 MG/DL (ref 0.55–1.02)
GLOBULIN SER CALC-MCNC: 3.3 G/DL (ref 2–4)
GLUCOSE SERPL-MCNC: 95 MG/DL (ref 65–100)
HDLC SERPL-MCNC: 50 MG/DL
LDL CHOLESTEROL POC: 60 MG/DL
NON-HDL CHOLESTEROL, 011976: 71
POTASSIUM SERPL-SCNC: 3.9 MMOL/L (ref 3.5–5.1)
PROT SERPL-MCNC: 6.8 G/DL (ref 6.4–8.2)
SODIUM SERPL-SCNC: 140 MMOL/L (ref 136–145)
TCHOL/HDL RATIO (POC): 2.4
TRIGL SERPL-MCNC: 55 MG/DL

## 2022-01-18 PROCEDURE — 99214 OFFICE O/P EST MOD 30 MIN: CPT | Performed by: INTERNAL MEDICINE

## 2022-01-18 PROCEDURE — 80061 LIPID PANEL: CPT | Performed by: INTERNAL MEDICINE

## 2022-01-18 RX ORDER — ERGOCALCIFEROL 1.25 MG/1
50000 CAPSULE ORAL
Qty: 4 CAPSULE | Refills: 12 | Status: SHIPPED | OUTPATIENT
Start: 2022-01-18

## 2022-01-18 NOTE — PATIENT INSTRUCTIONS
Ringostat Activation    Thank you for requesting access to Ring[facility]. Please follow the instructions below to securely access and download your online medical record. Ringostat allows you to send messages to your doctor, view your test results, renew your prescriptions, schedule appointments, and more. How Do I Sign Up? 1. In your internet browser, go to [email or web address]  2. Click on the First Time User? Click Here link in the Sign In box. You will be redirect to the New Member Sign Up page. 3. Enter your Ringostat Access Code exactly as it appears below. You will not need to use this code after youve completed the sign-up process. If you do not sign up before the expiration date, you must request a new code. Ringostat Access Code: [email or web address]  Expires: 2022  7:24 AM (This is the date your Ringostat access code will )    4. Enter the last four digits of your Social Security Number (xxxx) and Date of Birth (mm/dd/yyyy) as indicated and click Submit. You will be taken to the next sign-up page. 5. Create a Ringostat ID. This will be your Ringostat login ID and cannot be changed, so think of one that is secure and easy to remember. 6. Create a Ringostat password. You can change your password at any time. 7. Enter your Password Reset Question and Answer. This can be used at a later time if you forget your password. 8. Enter your e-mail address. You will receive e-mail notification when new information is available in [address] 9. Click Sign Up. You can now view and download portions of your medical record. 10. Click the Download Summary menu link to download a portable copy of your medical information. Additional Information    If you have questions, please visit the Frequently Asked Questions section of the Ringostat website at [email or web address] Remember, Ringostat is NOT to be used for urgent needs. For medical emergencies, dial 911.

## 2022-01-18 NOTE — PROGRESS NOTES
Deborah Setwart is a 39 y.o. female and presents with Cholesterol Problem and Vitamin D Deficiency  . Subjective:      Dyslipidemia Review:  Patient presents for evaluation of lipids. Compliance with treatment thus far has been excellent. A repeat fasting lipid profile was not done. The patient does not use medications that may worsen dyslipidemias (corticosteroids, progestins, anabolic steroids, amiodarone, cyclosporine, olanzapine). The patient exercises some      Allergic Rhinitis  Patient presents for evaluation of allergic symptoms. denies nasal congestion, rhinorrhea, sneezing, eye itching, watery eyes. Precipitants haved included possible pollen. Vitamin D Deficiency Review   The patient has a previous history of vitamin d deficiency  The patient is on medication for vitamin d deficiency  The patient has denied any recent falls or fractures        Review of Systems  Constitutional: negative for fevers, chills, anorexia and weight loss  Eyes:   negative for visual disturbance and irritation  ENT:   negative for tinnitus,sore throat,nasal congestion,ear pains. hoarseness  Respiratory:  negative for cough, hemoptysis, dyspnea,wheezing  CV:   negative for chest pain, palpitations, lower extremity edema  GI:   negative for nausea, vomiting, diarrhea, abdominal pain,melena  Endo:               negative for polyuria,polydipsia,polyphagia,heat intolerance  Genitourinary: negative for frequency, dysuria and hematuria  Integument:  negative for rash and pruritus  Hematologic:  negative for easy bruising and gum/nose bleeding  Musculoskel:  myalgias, arthralgias,  Neurological:  negative for headaches, dizziness, vertigo, memory problems and gait   Behavl/Psych: negative for feelings of anxiety, depression, mood changes    History reviewed. No pertinent past medical history.   Past Surgical History:   Procedure Laterality Date    HX HEENT  08/2016    cornea transplant    HX HEENT  2015    cornea transplant Social History     Socioeconomic History    Marital status:    Tobacco Use    Smoking status: Former Smoker    Smokeless tobacco: Never Used   Substance and Sexual Activity    Alcohol use: No    Drug use: No    Sexual activity: Yes     Partners: Male     Family History   Problem Relation Age of Onset    Hypertension Mother     Hypertension Father     Diabetes Father      Current Outpatient Medications   Medication Sig Dispense Refill    ergocalciferol (ERGOCALCIFEROL) 1,250 mcg (50,000 unit) capsule Take 1 Capsule by mouth every seven (7) days. 4 Capsule 12    cyclobenzaprine (FLEXERIL) 10 mg tablet TAKE 1 TABLET BY MOUTH TWICE A DAY 60 Tablet 3    ibuprofen (MOTRIN) 800 mg tablet TAKE 1 TABLET BY MOUTH TWICE DAILY AFTER MEALS 60 Tablet 3    atorvastatin (LIPITOR) 80 mg tablet TAKE 1 TABLET BY MOUTH EVERY DAY 90 Tablet 3    fluticasone (FLONASE) 50 mcg/actuation nasal spray INSTILL 2 SPRAYS IN EACH NOSTRIL EVERY DAY 1 Bottle 12    loratadine (CLARITIN) 10 mg tablet Take 1 Tab by mouth daily.  30 Tab 12     Allergies   Allergen Reactions    Banana Hives    Coconut Hives     Oil and food       Objective:  Visit Vitals  /88   Pulse 90   Temp 98 °F (36.7 °C) (Oral)   Resp 16   Ht 5' 2\" (1.575 m)   Wt 257 lb (116.6 kg)   SpO2 98%   BMI 47.01 kg/m²     Physical Exam:   General appearance - alert, well appearing, and in no distress  Mental status - alert, oriented to person, place, and time  EYE-EVELIA, EOMI, corneas normal, no foreign bodies  ENT-ENT exam normal, no neck nodes or sinus tenderness  Nose - normal and patent, no erythema, discharge or polyps  Mouth - mucous membranes moist, pharynx normal without lesions  Neck - supple, no significant adenopathy   Chest - clear to auscultation, no wheezes, rales or rhonchi, symmetric air entry   Heart - normal rate, regular rhythm, normal S1, S2, no murmurs, rubs, clicks or gallops   Abdomen - soft, nontender, nondistended, no masses or organomegaly  Lymph- no adenopathy palpable  Ext-peripheral pulses normal, no pedal edema, no clubbing or cyanosis  Skin-Warm and dry. no hyperpigmentation, vitiligo, or suspicious lesions  Neuro -alert, oriented, normal speech, no focal findings or movement disorder noted  Neck-normal C-spine, no tenderness, full ROM without pain  Feet-no nail deformities or callus formation with good pulses noted      Results for orders placed or performed in visit on 08/16/21   AMB POC LIPID PROFILE   Result Value Ref Range    Cholesterol (POC) 136     Triglycerides (POC) 72     HDL Cholesterol (POC) 47     Non-HDL Cholesterol 90     LDL Cholesterol (POC) 75 MG/DL    TChol/HDL Ratio (POC) 2.9        Assessment/Plan:    ICD-10-CM ICD-9-CM    1. Hypercholesteremia  E78.00 272.0 AMB POC LIPID PROFILE      METABOLIC PANEL, COMPREHENSIVE      LIPID PANEL   2. Vitamin D deficiency  E55.9 268.9 VITAMIN D, 25 HYDROXY      METABOLIC PANEL, COMPREHENSIVE   3. Seasonal allergic rhinitis due to fungal spores  F34.29 001.6 METABOLIC PANEL, COMPREHENSIVE     Orders Placed This Encounter    VITAMIN D, 25 HYDROXY     Standing Status:   Future     Standing Expiration Date:   5/05/4410    METABOLIC PANEL, COMPREHENSIVE     Standing Status:   Future     Standing Expiration Date:   1/18/2023    LIPID PANEL     Standing Status:   Future     Standing Expiration Date:   1/18/2023    AMB POC LIPID PROFILE    ergocalciferol (ERGOCALCIFEROL) 1,250 mcg (50,000 unit) capsule     Sig: Take 1 Capsule by mouth every seven (7) days. Dispense:  4 Capsule     Refill:  12     lose weight, increase physical activity, follow low fat diet, follow low salt diet, continue present plan,Take 81mg aspirin daily  Patient Instructions   [employer] Activation    Thank you for requesting access to [facility]. Please follow the instructions below to securely access and download your online medical record.  [facility]ilOne allows you to send messages to your doctor, view your test results, renew your prescriptions, schedule appointments, and more. How Do I Sign Up? 1. In your internet browser, go to [email or web address]. [email or web address]  2. Click on the First Time User? Click Here link in the Sign In box. You will be redirect to the New Member Sign Up page. 3. Enter your [facility] Direct Access Code exactly as it appears below. You will not need to use this code after youve completed the sign-up process. If you do not sign up before the expiration date, you must request a new code. [employer] Access Code: [email or web address]  Expires: 2022  7:24 AM (This is the date your [employer] Direct access code will )    4. Enter the last four digits of your Social Security Number (xxxx) and Date of Birth (mm/dd/yyyy) as indicated and click Submit. You will be taken to the next sign-up page. 5. Create a [employer]ban Direct ID. This will be your [employer] Direct login ID and cannot be changed, so think of one that is secure and easy to remember. 6. Create a [facility] Direct password. You can change your password at any time. 7. Enter your Password Reset Question and Answer. This can be used at a later time if you forget your password. 8. Enter your e-mail address. You will receive e-mail notification when new information is available in [address] 9. Click Sign Up. You can now view and download portions of your medical record. 10. Click the Download Summary menu link to download a portable copy of your medical information. Additional Information    If you have questions, please visit the Frequently Asked Questions section of the Corban Direct website at [email or web address] Remember, [facility]ban Direct is NOT to be used for urgent needs. For medical emergencies, dial 911. Follow-up and Dispositions    · Return in about 3 months (around 2022), or if symptoms worsen or fail to improve. I have reviewed with the patient details of the assessment and plan and all questions were answered.  Relevent patient education was performed. The most recent lab findings were reviewed with the patient. An After Visit Summary was printed and given to the patient.

## 2022-01-18 NOTE — PROGRESS NOTES
1. Have you been to the ER, urgent care clinic since your last visit? Hospitalized since your last visit?no    2. Have you seen or consulted any other health care providers outside of the [address] [place] [address] since your last visit? Include any pap smears or colon screening. no    Chief Complaint   Patient presents with    Cholesterol Problem    Vitamin D Deficiency     3 most recent PHQ Screens 8/16/2021   PHQ Not Done -   Little interest or pleasure in doing things Not at all   Feeling down, depressed, irritable, or hopeless Not at all   Total Score PHQ 2 0     Per Dr. Stuart Feldman.,  verbal order given for needed amb poc labs.

## 2022-02-14 ENCOUNTER — APPOINTMENT (OUTPATIENT)
Dept: GENERAL RADIOLOGY | Age: 42
End: 2022-02-14
Attending: EMERGENCY MEDICINE
Payer: COMMERCIAL

## 2022-02-14 ENCOUNTER — HOSPITAL ENCOUNTER (EMERGENCY)
Age: 42
Discharge: HOME | End: 2022-02-14
Attending: EMERGENCY MEDICINE
Payer: COMMERCIAL

## 2022-02-14 ENCOUNTER — APPOINTMENT (OUTPATIENT)
Dept: CT IMAGING | Age: 42
End: 2022-02-14
Attending: EMERGENCY MEDICINE
Payer: COMMERCIAL

## 2022-02-14 VITALS
HEIGHT: 62 IN | TEMPERATURE: 98.3 F | RESPIRATION RATE: 16 BRPM | SYSTOLIC BLOOD PRESSURE: 156 MMHG | HEART RATE: 89 BPM | WEIGHT: 256.17 LBS | OXYGEN SATURATION: 100 % | DIASTOLIC BLOOD PRESSURE: 111 MMHG | BODY MASS INDEX: 47.14 KG/M2

## 2022-02-14 DIAGNOSIS — I10 ESSENTIAL HYPERTENSION: ICD-10-CM

## 2022-02-14 DIAGNOSIS — R11.2 NAUSEA AND VOMITING IN ADULT: ICD-10-CM

## 2022-02-14 DIAGNOSIS — R07.9 ACUTE CHEST PAIN: Primary | ICD-10-CM

## 2022-02-14 DIAGNOSIS — G44.209 ACUTE NON INTRACTABLE TENSION-TYPE HEADACHE: ICD-10-CM

## 2022-02-14 LAB
ALBUMIN SERPL-MCNC: 3.6 G/DL (ref 3.5–5)
ALBUMIN/GLOB SERPL: 1 {RATIO} (ref 1.1–2.2)
ALP SERPL-CCNC: 103 U/L (ref 45–117)
ALT SERPL-CCNC: 21 U/L (ref 12–78)
ANION GAP SERPL CALC-SCNC: 3 MMOL/L (ref 5–15)
AST SERPL-CCNC: 11 U/L (ref 15–37)
BASOPHILS # BLD: 0 K/UL (ref 0–0.1)
BASOPHILS NFR BLD: 1 % (ref 0–1)
BILIRUB SERPL-MCNC: 0.5 MG/DL (ref 0.2–1)
BUN SERPL-MCNC: 6 MG/DL (ref 6–20)
BUN/CREAT SERPL: 7 (ref 12–20)
CALCIUM SERPL-MCNC: 8.7 MG/DL (ref 8.5–10.1)
CHLORIDE SERPL-SCNC: 106 MMOL/L (ref 97–108)
CO2 SERPL-SCNC: 29 MMOL/L (ref 21–32)
CREAT SERPL-MCNC: 0.85 MG/DL (ref 0.55–1.02)
DIFFERENTIAL METHOD BLD: ABNORMAL
EOSINOPHIL # BLD: 0 K/UL (ref 0–0.4)
EOSINOPHIL NFR BLD: 1 % (ref 0–7)
ERYTHROCYTE [DISTWIDTH] IN BLOOD BY AUTOMATED COUNT: 11.8 % (ref 11.5–14.5)
GLOBULIN SER CALC-MCNC: 3.7 G/DL (ref 2–4)
GLUCOSE SERPL-MCNC: 87 MG/DL (ref 65–100)
HCT VFR BLD AUTO: 42.9 % (ref 35–47)
HGB BLD-MCNC: 13.8 G/DL (ref 11.5–16)
IMM GRANULOCYTES # BLD AUTO: 0 K/UL (ref 0–0.04)
IMM GRANULOCYTES NFR BLD AUTO: 0 % (ref 0–0.5)
LYMPHOCYTES # BLD: 2 K/UL (ref 0.8–3.5)
LYMPHOCYTES NFR BLD: 41 % (ref 12–49)
MCH RBC QN AUTO: 29.9 PG (ref 26–34)
MCHC RBC AUTO-ENTMCNC: 32.2 G/DL (ref 30–36.5)
MCV RBC AUTO: 93.1 FL (ref 80–99)
MONOCYTES # BLD: 0.4 K/UL (ref 0–1)
MONOCYTES NFR BLD: 8 % (ref 5–13)
NEUTS SEG # BLD: 2.5 K/UL (ref 1.8–8)
NEUTS SEG NFR BLD: 49 % (ref 32–75)
NRBC # BLD: 0.04 K/UL (ref 0–0.01)
NRBC BLD-RTO: 0.8 PER 100 WBC
PLATELET # BLD AUTO: 388 K/UL (ref 150–400)
PMV BLD AUTO: 9.3 FL (ref 8.9–12.9)
POTASSIUM SERPL-SCNC: 3.6 MMOL/L (ref 3.5–5.1)
PROT SERPL-MCNC: 7.3 G/DL (ref 6.4–8.2)
RBC # BLD AUTO: 4.61 M/UL (ref 3.8–5.2)
SODIUM SERPL-SCNC: 138 MMOL/L (ref 136–145)
TROPONIN-HIGH SENSITIVITY: 8 NG/L (ref 0–51)
WBC # BLD AUTO: 5 K/UL (ref 3.6–11)

## 2022-02-14 PROCEDURE — 80053 COMPREHEN METABOLIC PANEL: CPT

## 2022-02-14 PROCEDURE — 93005 ELECTROCARDIOGRAM TRACING: CPT

## 2022-02-14 PROCEDURE — 74011250637 HC RX REV CODE- 250/637: Performed by: EMERGENCY MEDICINE

## 2022-02-14 PROCEDURE — 36415 COLL VENOUS BLD VENIPUNCTURE: CPT

## 2022-02-14 PROCEDURE — 99282 EMERGENCY DEPT VISIT SF MDM: CPT

## 2022-02-14 PROCEDURE — 85025 COMPLETE CBC W/AUTO DIFF WBC: CPT

## 2022-02-14 PROCEDURE — 70450 CT HEAD/BRAIN W/O DYE: CPT

## 2022-02-14 PROCEDURE — 84484 ASSAY OF TROPONIN QUANT: CPT

## 2022-02-14 RX ORDER — SPIRONOLACTONE 25 MG/1
50 TABLET ORAL
Status: COMPLETED | OUTPATIENT
Start: 2022-02-14 | End: 2022-02-14

## 2022-02-14 RX ORDER — SPIRONOLACTONE 50 MG/1
50 TABLET, FILM COATED ORAL 2 TIMES DAILY
Qty: 60 TABLET | Refills: 0 | Status: SHIPPED | OUTPATIENT
Start: 2022-02-14 | End: 2022-03-16

## 2022-02-14 RX ADMIN — SPIRONOLACTONE 50 MG: 25 TABLET ORAL at 17:32

## 2022-02-14 NOTE — ED PROVIDER NOTES
EMERGENCY DEPARTMENT HISTORY AND PHYSICAL EXAM      Date: 2/14/2022  Patient Name: Laurita Shaikh    History of Presenting Illness     Chief Complaint   Patient presents with    Hypertension     Pt ambulatory into triage with a cc of HTN that was noticed today; pt states she has been stressd recently. Pt has not had to take BP meds in over a year. History Provided By: Patient    HPI: Laurita Shaikh, 39 y.o. female presents to the ED with cc of hypertension, chest pain, vomiting and headache. The patient states that she has not been on her blood pressure medicine for several months, because she switched jobs and she did not have insurance. She noticed that when she is stressed out, her blood pressure is more elevated. Today at 9:30 AM, she developed a severe headache, vomited once, had chest pain which is in the left upper chest radiating down to the left arm and her blood pressure was 011 systolic. Pain lasted for about 3 hours. She says she did not want to come to the ER initially. Chest pain was severe.,  There was no radiation to the back, neck or jaw. Pain resolved spontaneously. She denies cough, fever or chills. She did have slight diaphoresis at the time of the chest pain. Her headache is mild currently. She denies trauma, or neck pain. She denies abdominal pain, and states she has some lightheadedness earlier today. Denies shortness of breath. There are no other complaints, changes, or physical findings at this time. PCP: Katherine Krishnan MD    No current facility-administered medications on file prior to encounter. Current Outpatient Medications on File Prior to Encounter   Medication Sig Dispense Refill    ergocalciferol (ERGOCALCIFEROL) 1,250 mcg (50,000 unit) capsule Take 1 Capsule by mouth every seven (7) days.  4 Capsule 12    cyclobenzaprine (FLEXERIL) 10 mg tablet TAKE 1 TABLET BY MOUTH TWICE A DAY 60 Tablet 3    ibuprofen (MOTRIN) 800 mg tablet TAKE 1 TABLET BY MOUTH TWICE DAILY AFTER MEALS 60 Tablet 3    atorvastatin (LIPITOR) 80 mg tablet TAKE 1 TABLET BY MOUTH EVERY DAY 90 Tablet 3    fluticasone (FLONASE) 50 mcg/actuation nasal spray INSTILL 2 SPRAYS IN EACH NOSTRIL EVERY DAY 1 Bottle 12    loratadine (CLARITIN) 10 mg tablet Take 1 Tab by mouth daily. 30 Tab 12       Past History     Past Medical History:  No past medical history on file. Past Surgical History:  Past Surgical History:   Procedure Laterality Date    HX HEENT  08/2016    cornea transplant    HX HEENT  2015    cornea transplant       Family History:  Family History   Problem Relation Age of Onset    Hypertension Mother     Hypertension Father     Diabetes Father        Social History:  Social History     Tobacco Use    Smoking status: Former Smoker    Smokeless tobacco: Never Used   Substance Use Topics    Alcohol use: No    Drug use: No       Allergies: Allergies   Allergen Reactions    Banana Hives    Coconut Hives     Oil and food         Review of Systems   Review of Systems   Constitutional: Negative for fever. HENT: Negative for congestion. Eyes: Negative. Respiratory: Negative for cough and shortness of breath. Cardiovascular: Positive for chest pain. Gastrointestinal: Positive for nausea and vomiting. Negative for abdominal pain. Endocrine: Negative for heat intolerance. Genitourinary: Negative for dysuria. Musculoskeletal: Negative for back pain. Skin: Negative for rash. Allergic/Immunologic: Negative for immunocompromised state. Neurological: Positive for light-headedness and headaches. Hematological: Does not bruise/bleed easily. Psychiatric/Behavioral: Negative. All other systems reviewed and are negative. Physical Exam   Physical Exam  Vitals and nursing note reviewed. Constitutional:       General: She is not in acute distress. Appearance: She is well-developed. HENT:      Head: Normocephalic.    Cardiovascular:      Rate and Rhythm: Normal rate and regular rhythm. Pulses: Normal pulses. Heart sounds: Normal heart sounds. Pulmonary:      Effort: Pulmonary effort is normal.      Breath sounds: Normal breath sounds. Chest:      Chest wall: No tenderness. Abdominal:      General: Bowel sounds are normal.      Palpations: Abdomen is soft. Tenderness: There is no abdominal tenderness. Musculoskeletal:         General: Normal range of motion. Cervical back: Normal range of motion and neck supple. Skin:     General: Skin is warm and dry. Neurological:      General: No focal deficit present. Mental Status: She is alert and oriented to person, place, and time. Psychiatric:         Mood and Affect: Mood normal.         Behavior: Behavior normal.         Diagnostic Study Results     Labs -     Recent Results (from the past 12 hour(s))   EKG, 12 LEAD, INITIAL    Collection Time: 02/14/22  1:31 PM   Result Value Ref Range    Ventricular Rate 83 BPM    Atrial Rate 83 BPM    P-R Interval 130 ms    QRS Duration 82 ms    Q-T Interval 358 ms    QTC Calculation (Bezet) 420 ms    Calculated P Axis 39 degrees    Calculated R Axis 2 degrees    Calculated T Axis 20 degrees    Diagnosis       Normal sinus rhythm with sinus arrhythmia  Minimal voltage criteria for LVH, may be normal variant  No previous ECGs available     CBC WITH AUTOMATED DIFF    Collection Time: 02/14/22  3:33 PM   Result Value Ref Range    WBC 5.0 3.6 - 11.0 K/uL    RBC 4.61 3.80 - 5.20 M/uL    HGB 13.8 11.5 - 16.0 g/dL    HCT 42.9 35.0 - 47.0 %    MCV 93.1 80.0 - 99.0 FL    MCH 29.9 26.0 - 34.0 PG    MCHC 32.2 30.0 - 36.5 g/dL    RDW 11.8 11.5 - 14.5 %    PLATELET 383 704 - 973 K/uL    MPV 9.3 8.9 - 12.9 FL    NRBC 0.8 (H) 0  WBC    ABSOLUTE NRBC 0.04 (H) 0.00 - 0.01 K/uL    NEUTROPHILS 49 32 - 75 %    LYMPHOCYTES 41 12 - 49 %    MONOCYTES 8 5 - 13 %    EOSINOPHILS 1 0 - 7 %    BASOPHILS 1 0 - 1 %    IMMATURE GRANULOCYTES 0 0.0 - 0.5 %    ABS. NEUTROPHILS 2.5 1.8 - 8.0 K/UL    ABS. LYMPHOCYTES 2.0 0.8 - 3.5 K/UL    ABS. MONOCYTES 0.4 0.0 - 1.0 K/UL    ABS. EOSINOPHILS 0.0 0.0 - 0.4 K/UL    ABS. BASOPHILS 0.0 0.0 - 0.1 K/UL    ABS. IMM. GRANS. 0.0 0.00 - 0.04 K/UL    DF AUTOMATED     METABOLIC PANEL, COMPREHENSIVE    Collection Time: 02/14/22  3:33 PM   Result Value Ref Range    Sodium 138 136 - 145 mmol/L    Potassium 3.6 3.5 - 5.1 mmol/L    Chloride 106 97 - 108 mmol/L    CO2 29 21 - 32 mmol/L    Anion gap 3 (L) 5 - 15 mmol/L    Glucose 87 65 - 100 mg/dL    BUN 6 6 - 20 MG/DL    Creatinine 0.85 0.55 - 1.02 MG/DL    BUN/Creatinine ratio 7 (L) 12 - 20      GFR est AA >60 >60 ml/min/1.73m2    GFR est non-AA >60 >60 ml/min/1.73m2    Calcium 8.7 8.5 - 10.1 MG/DL    Bilirubin, total 0.5 0.2 - 1.0 MG/DL    ALT (SGPT) 21 12 - 78 U/L    AST (SGOT) 11 (L) 15 - 37 U/L    Alk. phosphatase 103 45 - 117 U/L    Protein, total 7.3 6.4 - 8.2 g/dL    Albumin 3.6 3.5 - 5.0 g/dL    Globulin 3.7 2.0 - 4.0 g/dL    A-G Ratio 1.0 (L) 1.1 - 2.2     TROPONIN-HIGH SENSITIVITY    Collection Time: 02/14/22  3:33 PM   Result Value Ref Range    Troponin-High Sensitivity 8 0 - 51 ng/L       Radiologic Studies -   CT HEAD WO CONT   Final Result   No acute intracranial process. CT Results  (Last 48 hours)    None        CXR Results  (Last 48 hours)    None          Medical Decision Making   I am the first provider for this patient. I reviewed the vital signs, available nursing notes, past medical history, past surgical history, family history and social history. Vital Signs-Reviewed the patient's vital signs. Patient Vitals for the past 12 hrs:   Temp Pulse Resp BP SpO2   02/14/22 1328 98.3 °F (36.8 °C) 89 16 (!) 156/111 100 %       EKG interpretation: (Preliminary)  Rhythm: normal sinus rhythm; and regular . Rate (approx.): 83; Axis: normal; [place] interval: normal; QRS interval: normal ; ST/T wave: normal; Other findings: No previous EKGs.     Records Reviewed: Nursing Notes and Old Medical Records    Provider Notes (Medical Decision Making):   ACS, intracranial hemorrhage, dehydration, electrolyte abnormality, tension headache, hypertension    ED Course:   Initial assessment performed. The patients presenting problems have been discussed, and they are in agreement with the care plan formulated and outlined with them. I have encouraged them to ask questions as they arise throughout their visit. Progress note: The patient is feeling better. Her results were reviewed. She is advised to follow-up and return to ER if worse             Critical Care Time:   0      Disposition:  home    DISCHARGE PLAN:  1. Current Discharge Medication List      START taking these medications    Details   spironolactone (ALDACTONE) 50 mg tablet Take 1 Tablet by mouth two (2) times a day for 30 days. Qty: 60 Tablet, Refills: 0  Start date: 2/14/2022, End date: 3/16/2022           2. Follow-up Information     Follow up With Specialties Details Why Contact Info    Nabeel Arthur., MD Internal Medicine  As needed [address] [facility] [phone]      [facility] Cardiology  As needed [address] [facility] [address]   [address]     [facility] EMERGENCY DEPT Emergency Medicine  If symptoms worsen [address]  [address] [facility]  [phone]        3. Return to ED if worse     Diagnosis     Clinical Impression:   1. Acute chest pain    2. Acute non intractable tension-type headache    3. Nausea and vomiting in adult    4. Essential hypertension        Attestations:    Sofie Faye MD    Please note that this dictation was completed with [employer], the computer voice recognition software. Quite often unanticipated grammatical, syntax, homophones, and other interpretive errors are inadvertently transcribed by the computer software. Please disregard these errors.   Please excuse any errors that have escaped final proofreading. Thank you.

## 2022-02-15 LAB
ATRIAL RATE: 83 BPM
CALCULATED P AXIS, ECG09: 39 DEGREES
CALCULATED R AXIS, ECG10: 2 DEGREES
CALCULATED T AXIS, ECG11: 20 DEGREES
DIAGNOSIS, 93000: NORMAL
P-R INTERVAL, ECG05: 130 MS
Q-T INTERVAL, ECG07: 358 MS
QRS DURATION, ECG06: 82 MS
QTC CALCULATION (BEZET), ECG08: 420 MS
VENTRICULAR RATE, ECG03: 83 BPM

## 2022-02-24 DIAGNOSIS — E78.00 HYPERCHOLESTEREMIA: ICD-10-CM

## 2022-02-24 RX ORDER — IBUPROFEN 800 MG/1
TABLET ORAL
Qty: 60 TABLET | Refills: 3 | Status: SHIPPED | OUTPATIENT
Start: 2022-02-24

## 2022-03-19 PROBLEM — E66.01 OBESITY, MORBID (HCC): Status: ACTIVE | Noted: 2018-02-19

## 2022-07-02 ENCOUNTER — HOSPITAL ENCOUNTER (EMERGENCY)
Age: 42
Discharge: HOME | End: 2022-07-02
Attending: EMERGENCY MEDICINE

## 2022-07-02 VITALS
TEMPERATURE: 98 F | HEART RATE: 73 BPM | DIASTOLIC BLOOD PRESSURE: 98 MMHG | WEIGHT: 241.4 LBS | HEIGHT: 61 IN | RESPIRATION RATE: 14 BRPM | BODY MASS INDEX: 45.58 KG/M2 | SYSTOLIC BLOOD PRESSURE: 146 MMHG | OXYGEN SATURATION: 100 %

## 2022-07-02 DIAGNOSIS — N30.00 ACUTE CYSTITIS WITHOUT HEMATURIA: Primary | ICD-10-CM

## 2022-07-02 DIAGNOSIS — Z20.2 POSSIBLE EXPOSURE TO STD: ICD-10-CM

## 2022-07-02 DIAGNOSIS — N76.0 BACTERIAL VAGINOSIS: ICD-10-CM

## 2022-07-02 DIAGNOSIS — B96.89 BACTERIAL VAGINOSIS: ICD-10-CM

## 2022-07-02 LAB
APPEARANCE UR: ABNORMAL
BACTERIA URNS QL MICRO: ABNORMAL /HPF
BILIRUB UR QL: NEGATIVE
CLUE CELLS VAG QL WET PREP: NORMAL
COLOR UR: ABNORMAL
EPITH CASTS URNS QL MICRO: ABNORMAL /LPF
GLUCOSE UR STRIP.AUTO-MCNC: NEGATIVE MG/DL
HCG UR QL: NEGATIVE
HGB UR QL STRIP: NEGATIVE
KETONES UR QL STRIP.AUTO: NEGATIVE MG/DL
LEUKOCYTE ESTERASE UR QL STRIP.AUTO: ABNORMAL
NITRITE UR QL STRIP.AUTO: NEGATIVE
PH UR STRIP: 7 [PH] (ref 5–8)
PROT UR STRIP-MCNC: ABNORMAL MG/DL
RBC #/AREA URNS HPF: ABNORMAL /HPF (ref 0–5)
SP GR UR REFRACTOMETRY: 1.02
T VAGINALIS VAG QL WET PREP: NORMAL
UA: UC IF INDICATED,UAUC: ABNORMAL
UROBILINOGEN UR QL STRIP.AUTO: 1 EU/DL (ref 0.2–1)
WBC URNS QL MICRO: >100 /HPF (ref 0–4)

## 2022-07-02 PROCEDURE — 81001 URINALYSIS AUTO W/SCOPE: CPT

## 2022-07-02 PROCEDURE — 99283 EMERGENCY DEPT VISIT LOW MDM: CPT

## 2022-07-02 PROCEDURE — 87491 CHLMYD TRACH DNA AMP PROBE: CPT

## 2022-07-02 PROCEDURE — 81025 URINE PREGNANCY TEST: CPT

## 2022-07-02 PROCEDURE — 87210 SMEAR WET MOUNT SALINE/INK: CPT

## 2022-07-02 PROCEDURE — 87086 URINE CULTURE/COLONY COUNT: CPT

## 2022-07-02 RX ORDER — METRONIDAZOLE 500 MG/1
500 TABLET ORAL 2 TIMES DAILY
Qty: 14 TABLET | Refills: 0 | Status: SHIPPED | OUTPATIENT
Start: 2022-07-02 | End: 2022-07-09

## 2022-07-02 RX ORDER — CEFDINIR 300 MG/1
300 CAPSULE ORAL 2 TIMES DAILY
Qty: 14 CAPSULE | Refills: 0 | Status: SHIPPED | OUTPATIENT
Start: 2022-07-02 | End: 2022-07-09
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
## 2022-07-03 LAB
BACTERIA SPEC CULT: NORMAL
SERVICE CMNT-IMP: NORMAL

## 2022-07-05 LAB
C TRACH RRNA SPEC QL NAA+PROBE: NEGATIVE
N GONORRHOEA RRNA SPEC QL NAA+PROBE: NEGATIVE
SPECIMEN SOURCE: NORMAL

## 2022-07-22 NOTE — MR AVS SNAPSHOT
[address] [facility] [address] [address] 
[phone] Patient: Ernesto Cardoso MRN: [id number] XOB:[birth year] Visit Information Date & Time Provider Department Dept. Phone Encounter #  
 2/19/2018  2:45 PM Olive Dunn MD [address] [facility] [phone] [id number] Follow-up Instructions Return in about 6 months (around 8/19/2018), or if symptoms worsen or fail to improve. Upcoming Health Maintenance Date Due  
 PAP AKA CERVICAL CYTOLOGY 4/17/2001 DTaP/Tdap/Td series (1 - Tdap) 1/24/2019* MEDICARE YEARLY EXAM 11/11/2018 *Topic was postponed. The date shown is not the original due date. Allergies as of 2/19/2018  Review Complete On: 2/19/2018 By: Olive Dunn MD  
 Not on File Current Immunizations  Never Reviewed No immunizations on file. Not reviewed this visit You Were Diagnosed With   
  
 Codes Comments Hypercholesteremia    -  Primary ICD-10-CM: E78.00 ICD-9-CM: 272.0 Essential hypertension     ICD-10-CM: I10 
ICD-9-CM: 401.9 Chronic nonseasonal allergic rhinitis due to pollen     ICD-10-CM: J30.89 ICD-9-CM: 477.0 Vitals BP Pulse Temp Resp Height(growth percentile) Weight(growth percentile) 114/88 97 98.4 °F (36.9 °C) (Oral) 16 5' 2\" (1.575 m) 232 lb (105.2 kg) SpO2 BMI Smoking Status 98% 42.43 kg/m2 Former Smoker Vitals History BMI and BSA Data Body Mass Index Body Surface Area  
 42.43 kg/m 2 2.15 m 2 Preferred Pharmacy Pharmacy Name Phone [address], [address] [phone] Your Updated Medication List  
  
   
This list is accurate as of: 2/19/18  3:49 PM.  Always use your most recent med list.  
  
  
  
  
 atorvastatin 40 mg tablet Commonly known as:  LIPITOR  
TAKE 1 TABLET BY MOUTH DAILY clindamycin 1 % lotion Commonly known as:  CLEOCIN T  
  
 cyclobenzaprine 10 mg tablet Commonly known as:  FLEXERIL Take 1 Tab by mouth two (2) times a day. fluticasone 50 mcg/actuation nasal spray Commonly known as:  Cheron Vira INSTILL 2 SPRAYS IN EACH NOSTRIL EVERY DAY  
  
 gabapentin 100 mg capsule Commonly known as:  NEURONTIN  
TAKE 1 CAPSULE BY MOUTH EVERY NIGHT 2 HOURS BEFORE BEDTIME  
  
 ibuprofen 800 mg tablet Commonly known as:  MOTRIN Take 1 Tab by mouth two (2) times daily (after meals). loratadine 10 mg tablet Commonly known as:  Kelley Bones Take 1 Tab by mouth daily. losartan 100 mg tablet Commonly known as:  COZAAR Take 1 Tab by mouth daily. We Performed the Following AMB POC LIPID PROFILE [00041 CPT(R)] Follow-up Instructions Return in about 6 months (around 2018), or if symptoms worsen or fail to improve. Patient Instructions [employer] Activation Thank you for requesting access to [facility]. Please follow the instructions below to securely access and download your online medical record. [facility] allows you to send messages to your doctor, view your test results, renew your prescriptions, schedule appointments, and more. How Do I Sign Up? 1. In your internet browser, go to [email or web address] 
2. Click on the First Time User? Click Here link in the Sign In box. You will be redirect to the New Member Sign Up page. 3. Enter your [facility] Access Code exactly as it appears below. You will not need to use this code after youve completed the sign-up process. If you do not sign up before the expiration date, you must request a new code. [facility] Access Code: Activation code not generated Current [facility] Status: Patient Declined (This is the date your [facility] access code will ) 4.  Enter the last four digits of your Social Security Number (xxxx) and Date of Birth (mm/dd/yyyy) as indicated and click Submit. You will be taken to the next sign-up page. 5. Create a [facility] ID. This will be your [facility] login ID and cannot be changed, so think of one that is secure and easy to remember. 6. Create a [facility] password. You can change your password at any time. 7. Enter your Password Reset Question and Answer. This can be used at a later time if you forget your password. 8. Enter your e-mail address. You will receive e-mail notification when new information is available in [address] 9. Click Sign Up. You can now view and download portions of your medical record. 10. Click the Download Summary menu link to download a portable copy of your medical information. Additional Information If you have questions, please visit the Frequently Asked Questions section of the [facility] website at [email or web address] Remember, [facility] is NOT to be used for urgent needs. For medical emergencies, dial 911. Please provide this summary of care documentation to your next provider. Your primary care clinician is listed as Shira Lopez. If you have any questions after today's visit, please call [phone]. Helical Rim Advancement Flap Text: Because of the tightness of the surrounding skin, the full-thickness nature of the defect with exposed cartilage, and to avoid deformity, a helical rim advancement flap was planned.  After prep and anesthesia, any protuberant cartilage or cartilage in the way of tissue movement and approximation was excised.  Then, an incision was made in the anterior portion of the ear through the skin to the posterior ear at the level of the perichondrium both superiorly and inferiorly.  Inferiorly, this extended to the lobule where a Burow’s triangle was excised anteriorly.  The flaps were then  from the ear posteriorly at the level of the perichondrium to the postauricular sulcus.  After hemostasis obtained, the flaps were advanced and closed in a layered fashion

## 2024-07-10 ENCOUNTER — OFFICE VISIT (OUTPATIENT)
Facility: CLINIC | Age: 44
End: 2024-07-10

## 2024-07-10 VITALS
RESPIRATION RATE: 16 BRPM | SYSTOLIC BLOOD PRESSURE: 136 MMHG | WEIGHT: 284 LBS | DIASTOLIC BLOOD PRESSURE: 96 MMHG | TEMPERATURE: 98 F | HEIGHT: 61 IN | HEART RATE: 90 BPM | BODY MASS INDEX: 53.62 KG/M2 | OXYGEN SATURATION: 98 %

## 2024-07-10 DIAGNOSIS — R53.82 CHRONIC FATIGUE: ICD-10-CM

## 2024-07-10 DIAGNOSIS — M79.671 FOOT PAIN, RIGHT: ICD-10-CM

## 2024-07-10 DIAGNOSIS — I10 PRIMARY HYPERTENSION: Primary | ICD-10-CM

## 2024-07-10 DIAGNOSIS — M47.817 LUMBAR AND SACRAL OSTEOARTHRITIS: ICD-10-CM

## 2024-07-10 DIAGNOSIS — E78.00 HYPERCHOLESTEREMIA: ICD-10-CM

## 2024-07-10 DIAGNOSIS — E55.9 VITAMIN D DEFICIENCY: ICD-10-CM

## 2024-07-10 DIAGNOSIS — J06.9 VIRAL UPPER RESPIRATORY TRACT INFECTION: ICD-10-CM

## 2024-07-10 PROCEDURE — 99215 OFFICE O/P EST HI 40 MIN: CPT | Performed by: INTERNAL MEDICINE

## 2024-07-10 PROCEDURE — 3080F DIAST BP >= 90 MM HG: CPT | Performed by: INTERNAL MEDICINE

## 2024-07-10 PROCEDURE — 3075F SYST BP GE 130 - 139MM HG: CPT | Performed by: INTERNAL MEDICINE

## 2024-07-10 RX ORDER — ERGOCALCIFEROL 1.25 MG/1
50000 CAPSULE ORAL
Qty: 4 CAPSULE | Refills: 3 | Status: SHIPPED | OUTPATIENT
Start: 2024-07-10

## 2024-07-10 RX ORDER — ROSUVASTATIN CALCIUM 10 MG/1
10 TABLET, COATED ORAL NIGHTLY
Qty: 30 TABLET | Refills: 3 | Status: SHIPPED | OUTPATIENT
Start: 2024-07-10

## 2024-07-10 RX ORDER — CYCLOBENZAPRINE HCL 10 MG
10 TABLET ORAL 2 TIMES DAILY
Qty: 60 TABLET | Refills: 3 | Status: SHIPPED | OUTPATIENT
Start: 2024-07-10

## 2024-07-10 RX ORDER — IBUPROFEN 800 MG/1
TABLET ORAL
Qty: 60 TABLET | Refills: 3 | Status: SHIPPED | OUTPATIENT
Start: 2024-07-10

## 2024-07-10 RX ORDER — VALSARTAN 160 MG/1
160 TABLET ORAL DAILY
Qty: 30 TABLET | Refills: 3 | Status: SHIPPED | OUTPATIENT
Start: 2024-07-10

## 2024-07-10 SDOH — ECONOMIC STABILITY: INCOME INSECURITY: HOW HARD IS IT FOR YOU TO PAY FOR THE VERY BASICS LIKE FOOD, HOUSING, MEDICAL CARE, AND HEATING?: NOT HARD AT ALL

## 2024-07-10 SDOH — ECONOMIC STABILITY: FOOD INSECURITY: WITHIN THE PAST 12 MONTHS, YOU WORRIED THAT YOUR FOOD WOULD RUN OUT BEFORE YOU GOT MONEY TO BUY MORE.: NEVER TRUE

## 2024-07-10 SDOH — ECONOMIC STABILITY: FOOD INSECURITY: WITHIN THE PAST 12 MONTHS, THE FOOD YOU BOUGHT JUST DIDN'T LAST AND YOU DIDN'T HAVE MONEY TO GET MORE.: NEVER TRUE

## 2024-07-10 SDOH — ECONOMIC STABILITY: HOUSING INSECURITY
IN THE LAST 12 MONTHS, WAS THERE A TIME WHEN YOU DID NOT HAVE A STEADY PLACE TO SLEEP OR SLEPT IN A SHELTER (INCLUDING NOW)?: NO

## 2024-07-10 ASSESSMENT — PATIENT HEALTH QUESTIONNAIRE - PHQ9
SUM OF ALL RESPONSES TO PHQ QUESTIONS 1-9: 0
SUM OF ALL RESPONSES TO PHQ9 QUESTIONS 1 & 2: 0
1. LITTLE INTEREST OR PLEASURE IN DOING THINGS: NOT AT ALL
SUM OF ALL RESPONSES TO PHQ9 QUESTIONS 1 & 2: 0
SUM OF ALL RESPONSES TO PHQ QUESTIONS 1-9: 0
2. FEELING DOWN, DEPRESSED OR HOPELESS: NOT AT ALL
SUM OF ALL RESPONSES TO PHQ QUESTIONS 1-9: 0
SUM OF ALL RESPONSES TO PHQ QUESTIONS 1-9: 0
2. FEELING DOWN, DEPRESSED OR HOPELESS: NOT AT ALL
1. LITTLE INTEREST OR PLEASURE IN DOING THINGS: NOT AT ALL
SUM OF ALL RESPONSES TO PHQ QUESTIONS 1-9: 0
SUM OF ALL RESPONSES TO PHQ QUESTIONS 1-9: 0

## 2024-07-10 NOTE — PATIENT INSTRUCTIONS
Thank you for enrolling in [facility]. Please follow the instructions below to securely access your online medical record. [facility] allows you to send messages to your doctor, view your test results, renew your prescriptions, schedule appointments, and more.     How Do I Sign Up?  In your Internet browser, go to [email or web address]  Click on the Sign Up Now link in the Sign In box. You will see the New Member Sign Up page.  Enter your [facility] Access Code exactly as it appears below. You will not need to use this code after you’ve completed the sign-up process. If you do not sign up before the expiration date, you must request a new code.  [facility] Access Code: Activation code not generated  Current [facility] Status: Active    Enter your Social Security Number ([id number]) and Date of Birth (mm/dd/yyyy) as indicated and click Submit. You will be taken to the next sign-up page.  Create a [facility] ID. This will be your [facility] login ID and cannot be changed, so think of one that is secure and easy to remember.  Create a [facility] password. You can change your password at any time.  Enter your Password Reset Question and Answer. This can be used at a later time if you forget your password.   Enter your e-mail address. You will receive e-mail notification when new information is available in [facility].  Click Sign Up. You can now view your medical record.     Additional Information  If you have questions, please contact your physician practice where you receive care. Remember, [facility] is NOT to be used for urgent needs. For medical emergencies, dial 911.

## 2024-07-10 NOTE — PROGRESS NOTES
1. Have you been to the ER, urgent care clinic since your last visit?  Hospitalized since your last visit?No    2. Have you seen or consulted any other health care providers outside of the [facility] since your last visit?  Include any pap smears or colon screening. No     Chief Complaint   Patient presents with    Cholesterol Problem         PHQ-9 Total Score: 0 (7/10/2024  8:43 AM)    
  Medication Sig Dispense Refill    ergocalciferol (ERGOCALCIFEROL) 1.25 MG (32799 UT) capsule Take 1 capsule by mouth every 7 days 4 capsule 3    cyclobenzaprine (FLEXERIL) 10 MG tablet Take 1 tablet by mouth 2 times daily 60 tablet 3    ibuprofen (ADVIL;MOTRIN) 800 MG tablet TAKE 1 TABLET BY MOUTH TWICE DAILY AFTER MEALS 60 tablet 3    rosuvastatin (CRESTOR) 10 MG tablet Take 1 tablet by mouth nightly 30 tablet 3    valsartan (DIOVAN) 160 MG tablet Take 1 tablet by mouth daily 30 tablet 3     No current facility-administered medications for this visit.     Allergies   Allergen Reactions    Banana Hives    Coconut Fatty Acids Hives     Oil and food       Objective:  BP (!) 136/96   Pulse 90   Temp 98 °F (36.7 °C) (Oral)   Resp 16   Ht 1.549 m (5' 1\")   Wt 128.8 kg (284 lb)   SpO2 98%   BMI 53.66 kg/m²   Physical Exam:   General appearance - alert, well appearing, and in no distress  Mental status - alert, oriented to person, place, and time  EYE-LEDY, EOMI, corneas normal, no foreign bodies  ENT-ENT exam normal, no neck nodes or sinus tenderness  Nose - normal and patent, no erythema, discharge or polyps  Mouth - mucous membranes moist, pharynx normal without lesions  Neck - supple, no significant adenopathy   Chest - clear to auscultation, no wheezes, rales or rhonchi, symmetric air entry   Heart - normal rate, regular rhythm, normal S1, S2, no murmurs, rubs, clicks or gallops   Abdomen - soft, nontender, nondistended, no masses or organomegaly  Lymph- no adenopathy palpable  Ext-peripheral pulses normal, no pedal edema, no clubbing or cyanosis  Skin-Warm and dry. no hyperpigmentation, vitiligo, or suspicious lesions  Neuro -alert, oriented, normal speech, no focal findings or movement disorder noted  Neck-normal C-spine, no tenderness, full ROM without pain  Feet-no nail deformities or callus formation with good pulses noted      No results found for this visit on 07/10/24.    Assessment/Plan:

## 2024-07-11 LAB
ALBUMIN SERPL-MCNC: 3.3 G/DL (ref 3.5–5)
ALBUMIN/GLOB SERPL: 1 (ref 1.1–2.2)
ALP SERPL-CCNC: 69 U/L (ref 45–117)
ALT SERPL-CCNC: 14 U/L (ref 12–78)
ANION GAP SERPL CALC-SCNC: 6 MMOL/L (ref 5–15)
AST SERPL-CCNC: 9 U/L (ref 15–37)
BILIRUB SERPL-MCNC: 0.5 MG/DL (ref 0.2–1)
BUN SERPL-MCNC: 12 MG/DL (ref 6–20)
BUN/CREAT SERPL: 14 (ref 12–20)
CALCIUM SERPL-MCNC: 8.9 MG/DL (ref 8.5–10.1)
CHLORIDE SERPL-SCNC: 108 MMOL/L (ref 97–108)
CHOLEST SERPL-MCNC: 217 MG/DL
CO2 SERPL-SCNC: 26 MMOL/L (ref 21–32)
CREAT SERPL-MCNC: 0.87 MG/DL (ref 0.55–1.02)
ERYTHROCYTE [DISTWIDTH] IN BLOOD BY AUTOMATED COUNT: 12.8 % (ref 11.5–14.5)
GLOBULIN SER CALC-MCNC: 3.4 G/DL (ref 2–4)
GLUCOSE SERPL-MCNC: 93 MG/DL (ref 65–100)
HCT VFR BLD AUTO: 37.9 % (ref 35–47)
HDLC SERPL-MCNC: 55 MG/DL
HDLC SERPL: 3.9 (ref 0–5)
HGB BLD-MCNC: 11.8 G/DL (ref 11.5–16)
LDLC SERPL CALC-MCNC: 144.4 MG/DL (ref 0–100)
MCH RBC QN AUTO: 27.6 PG (ref 26–34)
MCHC RBC AUTO-ENTMCNC: 31.1 G/DL (ref 30–36.5)
MCV RBC AUTO: 88.8 FL (ref 80–99)
NRBC # BLD: 0 K/UL (ref 0–0.01)
NRBC BLD-RTO: 0 PER 100 WBC
PLATELET # BLD AUTO: 443 K/UL (ref 150–400)
PMV BLD AUTO: 9.4 FL (ref 8.9–12.9)
POTASSIUM SERPL-SCNC: 4.8 MMOL/L (ref 3.5–5.1)
PROT SERPL-MCNC: 6.7 G/DL (ref 6.4–8.2)
RBC # BLD AUTO: 4.27 M/UL (ref 3.8–5.2)
SODIUM SERPL-SCNC: 140 MMOL/L (ref 136–145)
T4 SERPL-MCNC: 7 UG/DL (ref 4.8–13.9)
TRIGL SERPL-MCNC: 88 MG/DL
TSH SERPL DL<=0.05 MIU/L-ACNC: 1.75 UIU/ML (ref 0.36–3.74)
URATE SERPL-MCNC: 6.5 MG/DL (ref 2.6–6)
VLDLC SERPL CALC-MCNC: 17.6 MG/DL
WBC # BLD AUTO: 8.6 K/UL (ref 3.6–11)

## 2024-08-02 ENCOUNTER — TRANSCRIBE ORDERS (OUTPATIENT)
Facility: HOSPITAL | Age: 44
End: 2024-08-02

## 2024-08-02 DIAGNOSIS — Z12.31 ENCOUNTER FOR SCREENING MAMMOGRAM FOR MALIGNANT NEOPLASM OF BREAST: Primary | ICD-10-CM

## 2024-09-09 ENCOUNTER — OFFICE VISIT (OUTPATIENT)
Facility: CLINIC | Age: 44
End: 2024-09-09
Payer: COMMERCIAL

## 2024-09-09 VITALS
DIASTOLIC BLOOD PRESSURE: 88 MMHG | TEMPERATURE: 98.6 F | HEART RATE: 97 BPM | WEIGHT: 290 LBS | RESPIRATION RATE: 16 BRPM | OXYGEN SATURATION: 97 % | HEIGHT: 61 IN | BODY MASS INDEX: 54.75 KG/M2 | SYSTOLIC BLOOD PRESSURE: 130 MMHG

## 2024-09-09 DIAGNOSIS — E55.9 VITAMIN D DEFICIENCY: ICD-10-CM

## 2024-09-09 DIAGNOSIS — E78.00 HYPERCHOLESTEREMIA: ICD-10-CM

## 2024-09-09 DIAGNOSIS — J40 BRONCHITIS: ICD-10-CM

## 2024-09-09 DIAGNOSIS — I10 PRIMARY HYPERTENSION: ICD-10-CM

## 2024-09-09 DIAGNOSIS — J40 BRONCHITIS: Primary | ICD-10-CM

## 2024-09-09 PROCEDURE — 3079F DIAST BP 80-89 MM HG: CPT | Performed by: INTERNAL MEDICINE

## 2024-09-09 PROCEDURE — 3075F SYST BP GE 130 - 139MM HG: CPT | Performed by: INTERNAL MEDICINE

## 2024-09-09 PROCEDURE — 99214 OFFICE O/P EST MOD 30 MIN: CPT | Performed by: INTERNAL MEDICINE

## 2024-09-09 RX ORDER — LEVOFLOXACIN 500 MG/1
500 TABLET, FILM COATED ORAL DAILY
Qty: 7 TABLET | Refills: 0 | Status: SHIPPED | OUTPATIENT
Start: 2024-09-09 | End: 2024-09-16

## 2024-09-09 RX ORDER — BENZONATATE 200 MG/1
200 CAPSULE ORAL 3 TIMES DAILY PRN
Qty: 20 CAPSULE | Refills: 0 | Status: SHIPPED | OUTPATIENT
Start: 2024-09-09 | End: 2024-09-16

## 2024-09-09 SDOH — ECONOMIC STABILITY: FOOD INSECURITY: WITHIN THE PAST 12 MONTHS, YOU WORRIED THAT YOUR FOOD WOULD RUN OUT BEFORE YOU GOT MONEY TO BUY MORE.: NEVER TRUE

## 2024-09-09 SDOH — ECONOMIC STABILITY: INCOME INSECURITY: HOW HARD IS IT FOR YOU TO PAY FOR THE VERY BASICS LIKE FOOD, HOUSING, MEDICAL CARE, AND HEATING?: SOMEWHAT HARD

## 2024-09-09 SDOH — ECONOMIC STABILITY: FOOD INSECURITY: WITHIN THE PAST 12 MONTHS, THE FOOD YOU BOUGHT JUST DIDN'T LAST AND YOU DIDN'T HAVE MONEY TO GET MORE.: NEVER TRUE

## 2024-09-09 ASSESSMENT — PATIENT HEALTH QUESTIONNAIRE - PHQ9
SUM OF ALL RESPONSES TO PHQ QUESTIONS 1-9: 0
SUM OF ALL RESPONSES TO PHQ QUESTIONS 1-9: 0
1. LITTLE INTEREST OR PLEASURE IN DOING THINGS: NOT AT ALL
2. FEELING DOWN, DEPRESSED OR HOPELESS: NOT AT ALL
SUM OF ALL RESPONSES TO PHQ9 QUESTIONS 1 & 2: 0
SUM OF ALL RESPONSES TO PHQ QUESTIONS 1-9: 0
SUM OF ALL RESPONSES TO PHQ QUESTIONS 1-9: 0

## 2024-09-09 ASSESSMENT — ANXIETY QUESTIONNAIRES
7. FEELING AFRAID AS IF SOMETHING AWFUL MIGHT HAPPEN: NOT AT ALL
3. WORRYING TOO MUCH ABOUT DIFFERENT THINGS: NOT AT ALL
GAD7 TOTAL SCORE: 0
4. TROUBLE RELAXING: NOT AT ALL
6. BECOMING EASILY ANNOYED OR IRRITABLE: NOT AT ALL
1. FEELING NERVOUS, ANXIOUS, OR ON EDGE: NOT AT ALL
2. NOT BEING ABLE TO STOP OR CONTROL WORRYING: NOT AT ALL
IF YOU CHECKED OFF ANY PROBLEMS ON THIS QUESTIONNAIRE, HOW DIFFICULT HAVE THESE PROBLEMS MADE IT FOR YOU TO DO YOUR WORK, TAKE CARE OF THINGS AT HOME, OR GET ALONG WITH OTHER PEOPLE: NOT DIFFICULT AT ALL
5. BEING SO RESTLESS THAT IT IS HARD TO SIT STILL: NOT AT ALL

## 2024-11-20 RX ORDER — VALSARTAN 160 MG/1
160 TABLET ORAL DAILY
Qty: 30 TABLET | Refills: 3 | Status: SHIPPED | OUTPATIENT
Start: 2024-11-20

## 2024-11-20 RX ORDER — ROSUVASTATIN CALCIUM 10 MG/1
10 TABLET, COATED ORAL NIGHTLY
Qty: 30 TABLET | Refills: 0 | OUTPATIENT
Start: 2024-11-20

## 2024-11-20 RX ORDER — VALSARTAN 160 MG/1
160 TABLET ORAL DAILY
Qty: 30 TABLET | Refills: 0 | OUTPATIENT
Start: 2024-11-20

## 2024-11-20 RX ORDER — ROSUVASTATIN CALCIUM 10 MG/1
10 TABLET, COATED ORAL NIGHTLY
Qty: 30 TABLET | Refills: 3 | Status: SHIPPED | OUTPATIENT
Start: 2024-11-20

## 2024-11-25 RX ORDER — ERGOCALCIFEROL 1.25 MG/1
50000 CAPSULE, LIQUID FILLED ORAL WEEKLY
Qty: 12 CAPSULE | Refills: 0 | Status: SHIPPED | OUTPATIENT
Start: 2024-11-25

## 2024-11-25 NOTE — TELEPHONE ENCOUNTER
Last appointment: 09/09/2024 MD [facility]   Next appointment: 12/09/2024 MD [facility]   Previous refill encounter(s):   07/10/2024 Vitamin D2 #4 with 3 refills.     For Pharmacy Admin Tracking Only    Program: Medication Refill  Intervention Detail: New Rx: 1, reason: Patient Preference  Time Spent (min): 5    Requested Prescriptions     Pending Prescriptions Disp Refills    vitamin D (ERGOCALCIFEROL) 1.25 MG (72583 UT) CAPS capsule [Pharmacy Med Name: Vitamin D (Ergocalciferol) 1.25 MG (02988 UT) Oral Capsule] 12 capsule 0     Sig: Take 1 capsule by mouth once a week

## 2024-11-27 RX ORDER — IBUPROFEN 800 MG/1
TABLET, FILM COATED ORAL
Qty: 60 TABLET | Refills: 0 | Status: SHIPPED | OUTPATIENT
Start: 2024-11-27

## 2024-11-27 NOTE — TELEPHONE ENCOUNTER
Pt left a voice message requesting a refill.     [phone]    Last appointment: 09/09/2024 MD [facility]   Next appointment: 12/09/2024 MD [facility]   Previous refill encounter(s):   07/10/2024 Ibuprofen #60 with 3 refills.     For Pharmacy Admin Tracking Only    Program: Medication Refill  Intervention Detail: New Rx: 1, reason: Patient Preference  Time Spent (min): 5    Requested Prescriptions     Pending Prescriptions Disp Refills    ibuprofen (ADVIL;MOTRIN) 800 MG tablet [Pharmacy Med Name: Ibuprofen 800 MG Oral Tablet] 60 tablet 0     Sig: TAKE 1 TABLET BY MOUTH TWICE DAILY AFTER A MEAL

## 2024-12-03 ENCOUNTER — TRANSCRIBE ORDERS (OUTPATIENT)
Facility: HOSPITAL | Age: 44
End: 2024-12-03

## 2024-12-03 ENCOUNTER — HOSPITAL ENCOUNTER (OUTPATIENT)
Facility: HOSPITAL | Age: 44
Discharge: HOME | End: 2024-12-06
Payer: COMMERCIAL

## 2024-12-03 DIAGNOSIS — R05.9 COUGH, UNSPECIFIED TYPE: Primary | ICD-10-CM

## 2024-12-03 DIAGNOSIS — R05.9 COUGH, UNSPECIFIED TYPE: ICD-10-CM

## 2024-12-03 PROCEDURE — 71046 X-RAY EXAM CHEST 2 VIEWS: CPT

## 2024-12-16 NOTE — TELEPHONE ENCOUNTER
Last appointment: 09/09/2024 MD [facility]   Next appointment: 01/13/2025 MD [facility]   Previous refill encounter(s):   07/10/2024 Flexeril #60 with 3 refills.     For Pharmacy Admin Tracking Only    Program: Medication Refill  Intervention Detail: New Rx: 1, reason: Patient Preference  Time Spent (min): 5    Requested Prescriptions     Pending Prescriptions Disp Refills    cyclobenzaprine (FLEXERIL) 10 MG tablet [Pharmacy Med Name: Cyclobenzaprine HCl 10 MG Oral Tablet] 60 tablet 0     Sig: Take 1 tablet by mouth twice daily

## 2024-12-17 RX ORDER — CYCLOBENZAPRINE HCL 10 MG
10 TABLET ORAL 2 TIMES DAILY
Qty: 60 TABLET | Refills: 0 | Status: SHIPPED | OUTPATIENT
Start: 2024-12-17

## 2024-12-30 RX ORDER — IBUPROFEN 800 MG/1
TABLET, FILM COATED ORAL
Qty: 60 TABLET | Refills: 3 | OUTPATIENT
Start: 2024-12-30

## 2024-12-31 ENCOUNTER — TRANSCRIBE ORDERS (OUTPATIENT)
Facility: HOSPITAL | Age: 44
End: 2024-12-31

## 2024-12-31 DIAGNOSIS — J98.4 RESTRICTIVE LUNG DISEASE: Primary | ICD-10-CM

## 2025-01-07 RX ORDER — IBUPROFEN 800 MG/1
TABLET, FILM COATED ORAL
Qty: 60 TABLET | Refills: 0 | Status: SHIPPED | OUTPATIENT
Start: 2025-01-07

## 2025-02-11 ENCOUNTER — OFFICE VISIT (OUTPATIENT)
Facility: CLINIC | Age: 45
End: 2025-02-11
Payer: COMMERCIAL

## 2025-02-11 VITALS
SYSTOLIC BLOOD PRESSURE: 126 MMHG | BODY MASS INDEX: 55.32 KG/M2 | WEIGHT: 293 LBS | DIASTOLIC BLOOD PRESSURE: 86 MMHG | HEIGHT: 61 IN | RESPIRATION RATE: 16 BRPM | TEMPERATURE: 98 F | HEART RATE: 92 BPM | OXYGEN SATURATION: 99 %

## 2025-02-11 DIAGNOSIS — E66.813 CLASS 3 SEVERE OBESITY DUE TO EXCESS CALORIES WITHOUT SERIOUS COMORBIDITY WITH BODY MASS INDEX (BMI) OF 50.0 TO 59.9 IN ADULT: ICD-10-CM

## 2025-02-11 DIAGNOSIS — E66.01 CLASS 3 SEVERE OBESITY DUE TO EXCESS CALORIES WITHOUT SERIOUS COMORBIDITY WITH BODY MASS INDEX (BMI) OF 50.0 TO 59.9 IN ADULT: ICD-10-CM

## 2025-02-11 DIAGNOSIS — E55.9 VITAMIN D DEFICIENCY: ICD-10-CM

## 2025-02-11 DIAGNOSIS — Z12.11 SCREENING FOR COLON CANCER: ICD-10-CM

## 2025-02-11 DIAGNOSIS — Z11.59 ENCOUNTER FOR HEPATITIS C SCREENING TEST FOR LOW RISK PATIENT: ICD-10-CM

## 2025-02-11 DIAGNOSIS — I10 PRIMARY HYPERTENSION: Primary | ICD-10-CM

## 2025-02-11 DIAGNOSIS — E78.00 HYPERCHOLESTEREMIA: ICD-10-CM

## 2025-02-11 DIAGNOSIS — Z11.4 SCREENING FOR HIV (HUMAN IMMUNODEFICIENCY VIRUS): ICD-10-CM

## 2025-02-11 PROCEDURE — 3074F SYST BP LT 130 MM HG: CPT | Performed by: INTERNAL MEDICINE

## 2025-02-11 PROCEDURE — 99214 OFFICE O/P EST MOD 30 MIN: CPT | Performed by: INTERNAL MEDICINE

## 2025-02-11 PROCEDURE — 3079F DIAST BP 80-89 MM HG: CPT | Performed by: INTERNAL MEDICINE

## 2025-02-11 RX ORDER — VALSARTAN 160 MG/1
160 TABLET ORAL DAILY
Qty: 30 TABLET | Refills: 12 | Status: SHIPPED | OUTPATIENT
Start: 2025-02-11

## 2025-02-11 RX ORDER — FLUTICASONE PROPIONATE AND SALMETEROL 250; 50 UG/1; UG/1
1 POWDER RESPIRATORY (INHALATION) 2 TIMES DAILY
COMMUNITY
Start: 2025-01-29

## 2025-02-11 RX ORDER — SEMAGLUTIDE 1.34 MG/ML
1 INJECTION, SOLUTION SUBCUTANEOUS
Qty: 1 ADJUSTABLE DOSE PRE-FILLED PEN SYRINGE | Refills: 0 | Status: SHIPPED | OUTPATIENT
Start: 2025-02-11

## 2025-02-11 RX ORDER — ERGOCALCIFEROL 1.25 MG/1
50000 CAPSULE, LIQUID FILLED ORAL WEEKLY
Qty: 12 CAPSULE | Refills: 0 | Status: SHIPPED | OUTPATIENT
Start: 2025-02-11

## 2025-02-11 RX ORDER — ROSUVASTATIN CALCIUM 10 MG/1
10 TABLET, COATED ORAL NIGHTLY
Qty: 30 TABLET | Refills: 12 | Status: SHIPPED | OUTPATIENT
Start: 2025-02-11

## 2025-02-11 RX ORDER — CYCLOBENZAPRINE HCL 10 MG
10 TABLET ORAL 2 TIMES DAILY
Qty: 60 TABLET | Refills: 0 | Status: SHIPPED | OUTPATIENT
Start: 2025-02-11

## 2025-02-11 SDOH — ECONOMIC STABILITY: FOOD INSECURITY: WITHIN THE PAST 12 MONTHS, YOU WORRIED THAT YOUR FOOD WOULD RUN OUT BEFORE YOU GOT MONEY TO BUY MORE.: NEVER TRUE

## 2025-02-11 SDOH — ECONOMIC STABILITY: FOOD INSECURITY: WITHIN THE PAST 12 MONTHS, THE FOOD YOU BOUGHT JUST DIDN'T LAST AND YOU DIDN'T HAVE MONEY TO GET MORE.: NEVER TRUE

## 2025-02-11 ASSESSMENT — ANXIETY QUESTIONNAIRES
4. TROUBLE RELAXING: NOT AT ALL
1. FEELING NERVOUS, ANXIOUS, OR ON EDGE: NOT AT ALL
7. FEELING AFRAID AS IF SOMETHING AWFUL MIGHT HAPPEN: NOT AT ALL
6. BECOMING EASILY ANNOYED OR IRRITABLE: NOT AT ALL
GAD7 TOTAL SCORE: 0
2. NOT BEING ABLE TO STOP OR CONTROL WORRYING: NOT AT ALL
3. WORRYING TOO MUCH ABOUT DIFFERENT THINGS: NOT AT ALL
5. BEING SO RESTLESS THAT IT IS HARD TO SIT STILL: NOT AT ALL
IF YOU CHECKED OFF ANY PROBLEMS ON THIS QUESTIONNAIRE, HOW DIFFICULT HAVE THESE PROBLEMS MADE IT FOR YOU TO DO YOUR WORK, TAKE CARE OF THINGS AT HOME, OR GET ALONG WITH OTHER PEOPLE: NOT DIFFICULT AT ALL

## 2025-02-11 ASSESSMENT — PATIENT HEALTH QUESTIONNAIRE - PHQ9
SUM OF ALL RESPONSES TO PHQ QUESTIONS 1-9: 0
SUM OF ALL RESPONSES TO PHQ9 QUESTIONS 1 & 2: 0
1. LITTLE INTEREST OR PLEASURE IN DOING THINGS: NOT AT ALL
2. FEELING DOWN, DEPRESSED OR HOPELESS: NOT AT ALL

## 2025-02-11 NOTE — PROGRESS NOTES
Chelsie Moran is a 44 y.o. female and presents with Hypertension and Cholesterol Problem  .  Subjective:  Hypertension Review:  The patient has hypertension .  Diet and Lifestyle: generally follows a low sodium diet, exercises sporadically  Home BP Monitoring: is not measured at home.  Pertinent ROS: taking medications as instructed, no medication side effects noted, no TIA's, no chest pain on exertion, no dyspnea on exertion, no swelling of ankles.    Dyslipidemia Review:  Patient presents for evaluation of lipids.  Compliance with treatment thus far has been excellent.  A repeat fasting lipid profile was done.  The patient does not use medications that may worsen dyslipidemias . The patient exercises sporadically.    Vitamin D Deficiency Review   The patient has a previous history of vitamin d deficiency  The patient is on medication for vitamin d deficiency  The patient has denied any recent falls or fractures        Review of Systems  Constitutional: negative for fevers, chills, anorexia and weight loss  Eyes:   negative for visual disturbance and irritation  ENT:   negative for tinnitus,sore throat,nasal congestion,ear pains.hoarseness  Respiratory:  negative for cough, hemoptysis, dyspnea,wheezing  CV:   negative for chest pain, palpitations, lower extremity edema  GI:   negative for nausea, vomiting, diarrhea, abdominal pain,melena  Endo:               negative for polyuria,polydipsia,polyphagia,heat intolerance  Genitourinary: negative for frequency, dysuria and hematuria  Integument:  negative for rash and pruritus  Hematologic:  negative for easy bruising and gum/nose bleeding  Musculoskel: negative for myalgias, arthralgias, back pain, muscle weakness, joint pain  Neurological:  negative for headaches, dizziness, vertigo, memory problems and gait   Behavl/Psych: negative for feelings of anxiety, depression, mood changes    No past medical history on file.  Past Surgical History:   Procedure

## 2025-02-12 LAB
ALBUMIN SERPL-MCNC: 3.5 G/DL (ref 3.9–4.9)
ALP SERPL-CCNC: 76 IU/L (ref 44–121)
ALT SERPL-CCNC: 10 IU/L (ref 0–32)
AST SERPL-CCNC: 11 IU/L (ref 0–40)
BILIRUB SERPL-MCNC: 0.3 MG/DL (ref 0–1.2)
BUN SERPL-MCNC: 10 MG/DL (ref 6–24)
BUN/CREAT SERPL: 11 (ref 9–23)
CALCIUM SERPL-MCNC: 8.8 MG/DL (ref 8.7–10.2)
CHLORIDE SERPL-SCNC: 104 MMOL/L (ref 96–106)
CO2 SERPL-SCNC: 24 MMOL/L (ref 20–29)
CREAT SERPL-MCNC: 0.91 MG/DL (ref 0.57–1)
EGFRCR SERPLBLD CKD-EPI 2021: 80 ML/MIN/1.73
ERYTHROCYTE [DISTWIDTH] IN BLOOD BY AUTOMATED COUNT: 13 % (ref 11.7–15.4)
GLOBULIN SER CALC-MCNC: 2.7 G/DL (ref 1.5–4.5)
GLUCOSE SERPL-MCNC: 98 MG/DL (ref 70–99)
HCT VFR BLD AUTO: 39.1 % (ref 34–46.6)
HCV IGG SERPL QL IA: NON REACTIVE
HGB BLD-MCNC: 12.8 G/DL (ref 11.1–15.9)
HIV 1+2 AB+HIV1 P24 AG SERPL QL IA: NON REACTIVE
MCH RBC QN AUTO: 28.7 PG (ref 26.6–33)
MCHC RBC AUTO-ENTMCNC: 32.7 G/DL (ref 31.5–35.7)
MCV RBC AUTO: 88 FL (ref 79–97)
PLATELET # BLD AUTO: 364 X10E3/UL (ref 150–450)
POTASSIUM SERPL-SCNC: 4.7 MMOL/L (ref 3.5–5.2)
PROT SERPL-MCNC: 6.2 G/DL (ref 6–8.5)
RBC # BLD AUTO: 4.46 X10E6/UL (ref 3.77–5.28)
SODIUM SERPL-SCNC: 141 MMOL/L (ref 134–144)
WBC # BLD AUTO: 3.5 X10E3/UL (ref 3.4–10.8)

## 2025-02-13 DIAGNOSIS — E66.813 CLASS 3 SEVERE OBESITY DUE TO EXCESS CALORIES WITHOUT SERIOUS COMORBIDITY WITH BODY MASS INDEX (BMI) OF 50.0 TO 59.9 IN ADULT: ICD-10-CM

## 2025-02-13 DIAGNOSIS — E66.01 CLASS 3 SEVERE OBESITY DUE TO EXCESS CALORIES WITHOUT SERIOUS COMORBIDITY WITH BODY MASS INDEX (BMI) OF 50.0 TO 59.9 IN ADULT: ICD-10-CM

## 2025-02-26 NOTE — TELEPHONE ENCOUNTER
Last appointment: 02/11/2025 MD [facility]   Next appointment: 03/11/2025 MD [facility]   Previous refill encounter(s):   01/07/2025 Ibuprofen #60     For Pharmacy Admin Tracking Only    Program: Medication Refill  Intervention Detail: New Rx: 1, reason: Patient Preference  Time Spent (min): 5    Requested Prescriptions     Pending Prescriptions Disp Refills    ibuprofen (ADVIL;MOTRIN) 800 MG tablet [Pharmacy Med Name: Ibuprofen 800 MG Oral Tablet] 60 tablet 0     Sig: TAKE 1 TABLET BY MOUTH TWICE DAILY AFTER A MEAL

## 2025-02-27 RX ORDER — IBUPROFEN 800 MG/1
TABLET, FILM COATED ORAL
Qty: 60 TABLET | Refills: 0 | Status: SHIPPED | OUTPATIENT
Start: 2025-02-27

## 2025-02-28 ENCOUNTER — TELEPHONE (OUTPATIENT)
Facility: CLINIC | Age: 45
End: 2025-02-28

## 2025-02-28 NOTE — TELEPHONE ENCOUNTER
PATIENT CALLED AND STATED SHE CALLED HER INSURANCE CO REGARDING THE OZEMPIC (SEMAGLUTIDE) AND WAS TOLD THAT IT HAS BEEN DENIED BECAUSE THEY NEED TO SPEAK WITH HER PCP.  I EXPLAINED TO HER THAT SOMETHING SHOULD HAVE BEEN FAXED HERE FROM THE INSURANCE COMPANY STATING THEY NEED TO SPEAK WITH THE PROVIDER.  SHE HAS [employer].  PLEASE CALL PATIENT.

## 2025-03-20 RX ORDER — SEMAGLUTIDE 1.34 MG/ML
INJECTION, SOLUTION SUBCUTANEOUS
Qty: 3 ML | Refills: 0 | Status: SHIPPED | OUTPATIENT
Start: 2025-03-20

## 2025-03-20 RX ORDER — IBUPROFEN 800 MG/1
TABLET, FILM COATED ORAL
Qty: 60 TABLET | Refills: 0 | Status: SHIPPED | OUTPATIENT
Start: 2025-03-20

## 2025-05-27 RX ORDER — IBUPROFEN 800 MG/1
TABLET, FILM COATED ORAL
Qty: 60 TABLET | Refills: 0 | Status: CANCELLED | OUTPATIENT
Start: 2025-05-27

## 2025-05-27 RX ORDER — ERGOCALCIFEROL 1.25 MG/1
50000 CAPSULE, LIQUID FILLED ORAL WEEKLY
Qty: 12 CAPSULE | Refills: 0 | Status: CANCELLED | OUTPATIENT
Start: 2025-05-27

## 2025-05-29 RX ORDER — ERGOCALCIFEROL 1.25 MG/1
50000 CAPSULE, LIQUID FILLED ORAL WEEKLY
Qty: 12 CAPSULE | Refills: 0 | Status: SHIPPED | OUTPATIENT
Start: 2025-05-29

## 2025-05-29 RX ORDER — IBUPROFEN 800 MG/1
TABLET, FILM COATED ORAL
Qty: 60 TABLET | Refills: 0 | Status: SHIPPED | OUTPATIENT
Start: 2025-05-29

## 2025-05-29 RX ORDER — ERGOCALCIFEROL 1.25 MG/1
CAPSULE, LIQUID FILLED ORAL
Qty: 12 CAPSULE | Refills: 0 | OUTPATIENT
Start: 2025-05-29

## 2025-07-09 NOTE — TELEPHONE ENCOUNTER
Pt requested refill(s) via MYCHART     Last appointment: 02/11/2025 MD [facility]   Next appointment: Nothing scheduled   Previous refill encounter(s):   05/29/2025 Ibuprofen #60     For Pharmacy Admin Tracking Only    Program: Medication Refill  Intervention Detail: New Rx: 1, reason: Patient Preference  Time Spent (min): 5    Requested Prescriptions     Pending Prescriptions Disp Refills    ibuprofen (ADVIL;MOTRIN) 800 MG tablet 60 tablet 0     Sig: TAKE 1 TABLET BY MOUTH TWICE DAILY AFTER A MEAL

## 2025-07-10 RX ORDER — IBUPROFEN 800 MG/1
TABLET, FILM COATED ORAL
Qty: 60 TABLET | Refills: 0 | Status: SHIPPED | OUTPATIENT
Start: 2025-07-10

## 2025-08-06 RX ORDER — IBUPROFEN 800 MG/1
TABLET, FILM COATED ORAL
Qty: 60 TABLET | Refills: 0 | OUTPATIENT
Start: 2025-08-06